# Patient Record
Sex: MALE | Race: WHITE | NOT HISPANIC OR LATINO | URBAN - METROPOLITAN AREA
[De-identification: names, ages, dates, MRNs, and addresses within clinical notes are randomized per-mention and may not be internally consistent; named-entity substitution may affect disease eponyms.]

---

## 2017-01-14 ENCOUNTER — EMERGENCY (EMERGENCY)
Facility: HOSPITAL | Age: 27
LOS: 0 days | Discharge: HOME | End: 2017-01-14

## 2017-06-27 DIAGNOSIS — Z87.891 PERSONAL HISTORY OF NICOTINE DEPENDENCE: ICD-10-CM

## 2017-06-27 DIAGNOSIS — R00.2 PALPITATIONS: ICD-10-CM

## 2017-06-27 DIAGNOSIS — R06.02 SHORTNESS OF BREATH: ICD-10-CM

## 2018-06-29 ENCOUNTER — INPATIENT (INPATIENT)
Facility: HOSPITAL | Age: 28
LOS: 1 days | Discharge: HOME | End: 2018-07-01
Attending: SURGERY | Admitting: SURGERY
Payer: COMMERCIAL

## 2018-06-29 VITALS
OXYGEN SATURATION: 98 % | HEIGHT: 69 IN | SYSTOLIC BLOOD PRESSURE: 134 MMHG | DIASTOLIC BLOOD PRESSURE: 89 MMHG | HEART RATE: 78 BPM | RESPIRATION RATE: 16 BRPM | WEIGHT: 220.02 LBS | TEMPERATURE: 99 F

## 2018-06-29 LAB
ALBUMIN SERPL ELPH-MCNC: 4.6 G/DL — SIGNIFICANT CHANGE UP (ref 3.5–5.2)
ALP SERPL-CCNC: 66 U/L — SIGNIFICANT CHANGE UP (ref 30–115)
ALT FLD-CCNC: 70 U/L — HIGH (ref 0–41)
ANION GAP SERPL CALC-SCNC: 19 MMOL/L — HIGH (ref 7–14)
AST SERPL-CCNC: 82 U/L — HIGH (ref 0–41)
BASOPHILS # BLD AUTO: 0.02 K/UL — SIGNIFICANT CHANGE UP (ref 0–0.2)
BASOPHILS NFR BLD AUTO: 0.2 % — SIGNIFICANT CHANGE UP (ref 0–1)
BILIRUB SERPL-MCNC: 0.8 MG/DL — SIGNIFICANT CHANGE UP (ref 0.2–1.2)
BLD GP AB SCN SERPL QL: SIGNIFICANT CHANGE UP
BUN SERPL-MCNC: 14 MG/DL — SIGNIFICANT CHANGE UP (ref 10–20)
CALCIUM SERPL-MCNC: 9.4 MG/DL — SIGNIFICANT CHANGE UP (ref 8.5–10.1)
CHLORIDE SERPL-SCNC: 97 MMOL/L — LOW (ref 98–110)
CK SERPL-CCNC: 590 U/L — HIGH (ref 0–225)
CO2 SERPL-SCNC: 23 MMOL/L — SIGNIFICANT CHANGE UP (ref 17–32)
CREAT SERPL-MCNC: 1.3 MG/DL — SIGNIFICANT CHANGE UP (ref 0.7–1.5)
EOSINOPHIL # BLD AUTO: 0.09 K/UL — SIGNIFICANT CHANGE UP (ref 0–0.7)
EOSINOPHIL NFR BLD AUTO: 1 % — SIGNIFICANT CHANGE UP (ref 0–8)
ETHANOL SERPL-MCNC: <10 MG/DL — HIGH
GLUCOSE SERPL-MCNC: 123 MG/DL — HIGH (ref 70–99)
HCT VFR BLD CALC: 45.1 % — SIGNIFICANT CHANGE UP (ref 42–52)
HGB BLD-MCNC: 15.1 G/DL — SIGNIFICANT CHANGE UP (ref 14–18)
IMM GRANULOCYTES NFR BLD AUTO: 1 % — HIGH (ref 0.1–0.3)
LACTATE SERPL-SCNC: 1.8 MMOL/L — SIGNIFICANT CHANGE UP (ref 0.5–2.2)
LIDOCAIN IGE QN: 63 U/L — HIGH (ref 7–60)
LYMPHOCYTES # BLD AUTO: 2.43 K/UL — SIGNIFICANT CHANGE UP (ref 1.2–3.4)
LYMPHOCYTES # BLD AUTO: 28 % — SIGNIFICANT CHANGE UP (ref 20.5–51.1)
MCHC RBC-ENTMCNC: 28.8 PG — SIGNIFICANT CHANGE UP (ref 27–31)
MCHC RBC-ENTMCNC: 33.5 G/DL — SIGNIFICANT CHANGE UP (ref 32–37)
MCV RBC AUTO: 85.9 FL — SIGNIFICANT CHANGE UP (ref 80–94)
MONOCYTES # BLD AUTO: 0.67 K/UL — HIGH (ref 0.1–0.6)
MONOCYTES NFR BLD AUTO: 7.7 % — SIGNIFICANT CHANGE UP (ref 1.7–9.3)
NEUTROPHILS # BLD AUTO: 5.38 K/UL — SIGNIFICANT CHANGE UP (ref 1.4–6.5)
NEUTROPHILS NFR BLD AUTO: 62.1 % — SIGNIFICANT CHANGE UP (ref 42.2–75.2)
NRBC # BLD: 0 /100 WBCS — SIGNIFICANT CHANGE UP (ref 0–0)
PLATELET # BLD AUTO: 252 K/UL — SIGNIFICANT CHANGE UP (ref 130–400)
POTASSIUM SERPL-MCNC: 4.3 MMOL/L — SIGNIFICANT CHANGE UP (ref 3.5–5)
POTASSIUM SERPL-SCNC: 4.3 MMOL/L — SIGNIFICANT CHANGE UP (ref 3.5–5)
PROT SERPL-MCNC: 7 G/DL — SIGNIFICANT CHANGE UP (ref 6–8)
RBC # BLD: 5.25 M/UL — SIGNIFICANT CHANGE UP (ref 4.7–6.1)
RBC # FLD: 12.6 % — SIGNIFICANT CHANGE UP (ref 11.5–14.5)
SODIUM SERPL-SCNC: 139 MMOL/L — SIGNIFICANT CHANGE UP (ref 135–146)
TROPONIN T SERPL-MCNC: <0.01 NG/ML — SIGNIFICANT CHANGE UP
TYPE + AB SCN PNL BLD: SIGNIFICANT CHANGE UP
WBC # BLD: 8.68 K/UL — SIGNIFICANT CHANGE UP (ref 4.8–10.8)
WBC # FLD AUTO: 8.68 K/UL — SIGNIFICANT CHANGE UP (ref 4.8–10.8)

## 2018-06-29 PROCEDURE — 99291 CRITICAL CARE FIRST HOUR: CPT

## 2018-06-29 RX ORDER — MORPHINE SULFATE 50 MG/1
4 CAPSULE, EXTENDED RELEASE ORAL ONCE
Qty: 0 | Refills: 0 | Status: DISCONTINUED | OUTPATIENT
Start: 2018-06-29 | End: 2018-06-29

## 2018-06-29 RX ORDER — BACITRACIN ZINC 500 UNIT/G
1 OINTMENT IN PACKET (EA) TOPICAL EVERY 6 HOURS
Qty: 0 | Refills: 0 | Status: DISCONTINUED | OUTPATIENT
Start: 2018-06-29 | End: 2018-07-01

## 2018-06-29 RX ORDER — MORPHINE SULFATE 50 MG/1
4 CAPSULE, EXTENDED RELEASE ORAL EVERY 4 HOURS
Qty: 0 | Refills: 0 | Status: DISCONTINUED | OUTPATIENT
Start: 2018-06-29 | End: 2018-06-29

## 2018-06-29 RX ORDER — ACETAMINOPHEN 500 MG
650 TABLET ORAL EVERY 6 HOURS
Qty: 0 | Refills: 0 | Status: DISCONTINUED | OUTPATIENT
Start: 2018-06-29 | End: 2018-07-01

## 2018-06-29 RX ORDER — PANTOPRAZOLE SODIUM 20 MG/1
40 TABLET, DELAYED RELEASE ORAL ONCE
Qty: 0 | Refills: 0 | Status: COMPLETED | OUTPATIENT
Start: 2018-06-29 | End: 2018-06-29

## 2018-06-29 RX ORDER — PHENYLEPHRINE HYDROCHLORIDE 10 MG/ML
0.1 INJECTION INTRAVENOUS
Qty: 40 | Refills: 0 | Status: DISCONTINUED | OUTPATIENT
Start: 2018-06-29 | End: 2018-06-29

## 2018-06-29 RX ORDER — SODIUM CHLORIDE 9 MG/ML
1000 INJECTION INTRAMUSCULAR; INTRAVENOUS; SUBCUTANEOUS
Qty: 0 | Refills: 0 | Status: DISCONTINUED | OUTPATIENT
Start: 2018-06-29 | End: 2018-06-30

## 2018-06-29 RX ORDER — MORPHINE SULFATE 50 MG/1
4 CAPSULE, EXTENDED RELEASE ORAL
Qty: 0 | Refills: 0 | Status: DISCONTINUED | OUTPATIENT
Start: 2018-06-29 | End: 2018-06-30

## 2018-06-29 RX ORDER — KETOROLAC TROMETHAMINE 30 MG/ML
30 SYRINGE (ML) INJECTION EVERY 6 HOURS
Qty: 0 | Refills: 0 | Status: DISCONTINUED | OUTPATIENT
Start: 2018-06-29 | End: 2018-07-01

## 2018-06-29 RX ADMIN — SODIUM CHLORIDE 100 MILLILITER(S): 9 INJECTION INTRAMUSCULAR; INTRAVENOUS; SUBCUTANEOUS at 12:30

## 2018-06-29 RX ADMIN — MORPHINE SULFATE 4 MILLIGRAM(S): 50 CAPSULE, EXTENDED RELEASE ORAL at 13:30

## 2018-06-29 RX ADMIN — Medication 30 MILLIGRAM(S): at 14:18

## 2018-06-29 RX ADMIN — MORPHINE SULFATE 4 MILLIGRAM(S): 50 CAPSULE, EXTENDED RELEASE ORAL at 09:10

## 2018-06-29 RX ADMIN — Medication 30 MILLIGRAM(S): at 18:07

## 2018-06-29 RX ADMIN — MORPHINE SULFATE 4 MILLIGRAM(S): 50 CAPSULE, EXTENDED RELEASE ORAL at 17:14

## 2018-06-29 RX ADMIN — Medication 650 MILLIGRAM(S): at 18:07

## 2018-06-29 RX ADMIN — PANTOPRAZOLE SODIUM 40 MILLIGRAM(S): 20 TABLET, DELAYED RELEASE ORAL at 14:20

## 2018-06-29 RX ADMIN — MORPHINE SULFATE 4 MILLIGRAM(S): 50 CAPSULE, EXTENDED RELEASE ORAL at 19:42

## 2018-06-29 RX ADMIN — MORPHINE SULFATE 4 MILLIGRAM(S): 50 CAPSULE, EXTENDED RELEASE ORAL at 16:45

## 2018-06-29 RX ADMIN — MORPHINE SULFATE 4 MILLIGRAM(S): 50 CAPSULE, EXTENDED RELEASE ORAL at 14:14

## 2018-06-29 RX ADMIN — MORPHINE SULFATE 4 MILLIGRAM(S): 50 CAPSULE, EXTENDED RELEASE ORAL at 18:08

## 2018-06-29 RX ADMIN — Medication 650 MILLIGRAM(S): at 14:17

## 2018-06-29 RX ADMIN — MORPHINE SULFATE 4 MILLIGRAM(S): 50 CAPSULE, EXTENDED RELEASE ORAL at 16:00

## 2018-06-29 RX ADMIN — MORPHINE SULFATE 4 MILLIGRAM(S): 50 CAPSULE, EXTENDED RELEASE ORAL at 09:16

## 2018-06-29 RX ADMIN — Medication 650 MILLIGRAM(S): at 13:30

## 2018-06-29 RX ADMIN — Medication 1 APPLICATION(S): at 18:07

## 2018-06-29 RX ADMIN — Medication 30 MILLIGRAM(S): at 13:30
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
## 2018-06-29 NOTE — ED PROVIDER NOTE - PROGRESS NOTE DETAILS
Chief Complaint   Patient presents with    Medication Refill   This patient is accompanied in the office by his mother. Mother statets child is here for medication refill. Mother states child has not taken medication in 1 year. 1. Have you been to the ER, urgent care clinic since your last visit? Hospitalized since your last visit? No    2. Have you seen or consulted any other health care providers outside of the [address] [place] [address] since your last visit? Include any pap smears or colon screening.  No Code Trauma called on EMS notification.  Dr. Rivero at bedside prior to pt arrival.
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
## 2018-06-29 NOTE — ED ADULT NURSE REASSESSMENT NOTE - RESPONSE TO
Incentive spirometer given, teaching done, pt return demonstrated. Verbalized understanding to inform RN of pain.

## 2018-06-29 NOTE — H&P ADULT - NSHPPHYSICALEXAM_GEN_ALL_CORE
ICU Vital Signs Last 24 Hrs  T(F): 98.6 (29 Jun 2018 07:56), Max: 98.6 (29 Jun 2018 07:56)  HR: 90 (29 Jun 2018 09:16) (78 - 90)  BP: 115/57 (29 Jun 2018 09:16) (115/57 - 144/68)  RR: 16 (29 Jun 2018 09:16) (16 - 18)  SpO2: 99% (29 Jun 2018 09:16) (98% - 99%)    CONSTITUTIONAL: GCS 15,Well-developed; well-nourished; in no acute distress.   SKIN: skin exam is warm and dry, abrasion to r. buttocks and r. elbow. left knee abrasion  HEAD: Normocephalic; atraumatic.  EYES:  EOM intact; conjunctiva and sclera clear.  ENT: PERRLA No nasal discharge; airway clear. moist oral mucosa;   NECK: Supple; non tender.  CARD: S1, S2 normal; no murmurs, gallops, or rubs. Regular rate and rhythm. posterior tibial and radial pulses 2+  RESP: No wheezes, rales or rhonchi. cta b/l. no use of accessory muscles. no retractions  ABD: Normal bowel sounds; soft; non-distended; non-tender; no rebound. good rectal tone  MSK: Normal ROM. moving all 4 extremities, no saddle anesthesia  BACK: No cva tenderness. no midline tenderness.    NEURO: Alert, oriented, grossly unremarkable.    PSYCH: Cooperative, appropriate.

## 2018-06-29 NOTE — H&P ADULT - NSHPLABSRESULTS_GEN_ALL_CORE
Complete Blood Count + Automated Diff (06.29.18 @ 07:52)    WBC Count: 8.68 K/uL    RBC Count: 5.25 M/uL    Hemoglobin: 15.1 g/dL    Hematocrit: 45.1 %    Mean Cell Volume: 85.9 fL    Mean Cell Hemoglobin: 28.8 pg    Mean Cell Hemoglobin Conc: 33.5 g/dL    Red Cell Distrib Width: 12.6 %    Platelet Count - Automated: 252 K/uL    Auto Neutrophil #: 5.38 K/uL    Auto Lymphocyte #: 2.43 K/uL    Auto Monocyte #: 0.67 K/uL    Auto Eosinophil #: 0.09 K/uL    Auto Basophil #: 0.02 K/uL    Auto Neutrophil %: 62.1: Differential percentages must be correlated with absolute numbers for  clinical significance. %    Auto Lymphocyte %: 28.0 %    Auto Monocyte %: 7.7 %    Auto Eosinophil %: 1.0 %    Auto Basophil %: 0.2 %    Auto Immature Granulocyte %: 1.0 %      Comprehensive Metabolic Panel (06.29.18 @ 07:52)    Sodium, Serum: 139 mmol/L    Potassium, Serum: 4.3: Slighty Hemolyzed use with Caution mmol/L    Chloride, Serum: 97 mmol/L    Carbon Dioxide, Serum: 23 mmol/L    Anion Gap, Serum: 19 mmol/L    Blood Urea Nitrogen, Serum: 14 mg/dL    Creatinine, Serum: 1.3 mg/dL    Glucose, Serum: 123 mg/dL    Calcium, Total Serum: 9.4 mg/dL    Protein Total, Serum: 7.0 g/dL    Albumin, Serum: 4.6 g/dL    Bilirubin Total, Serum: 0.8 mg/dL    Alkaline Phosphatase, Serum: 66 U/L    Aspartate Aminotransferase (AST/SGOT): 82: Hemolyzed. Interpret with caution U/L    Alanine Aminotransferase (ALT/SGPT): 70 U/L  < from: CT Head No Cont (06.29.18 @ 09:27) >    Radiology:    IMPRESSION:     No acute fractures, mass effect, midline shift or hemorrhage.     < end of copied text >    < from: Xray Chest 1 View- PORTABLE-Urgent (06.29.18 @ 08:01) >      Impression:      Multiple right-sided rib fractures.    Pulmonary vascular congestion    < end of copied text >    < from: CT Cervical Spine No Cont (06.29.18 @ 08:20) >    IMPRESSION:     1.  Degenerative changes of the cervical spine C4-5, C5-6 and C6-7 as   described above.    2.  No acute fractures or dislocations.    3.  If patient continues to be symptomatic or if there is suspicion for   ligamentous injury, MRI of the cervical spine may be helpful for further   evaluation.      < end of copied text >    < from: CT Angio Chest w/ IV Cont (06.29.18 @ 08:27) >    IMPRESSION:    1.  Multiple right rib fractures involving third through eighth ribs,   with minimal displacement of the third rib fracture.  2.  Patchy opacities in right upper, middle and lower lungs, which may   represent either pulmonary hemorrhage or contusion. No evidence of   pneumothorax.   3.  Small chest wall emphysema in right upper chest    < end of copied text >    < from: Xray Femur 2 Views, Right (06.29.18 @ 09:06) >    Impression:  No evidence of acute bony abnormality    < end of copied text >
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
## 2018-06-29 NOTE — ED PROVIDER NOTE - PHYSICAL EXAMINATION
VITAL SIGNS: I have reviewed nursing notes and confirm.  CONSTITUTIONAL: Well-developed; well-nourished; in no acute distress.   SKIN: skin exam is warm and dry, abrasion to r. buttocks and r. elbow.  HEAD: Normocephalic; atraumatic.  EYES:  EOM intact; conjunctiva and sclera clear.  ENT: PERRLA No nasal discharge; airway clear. moist oral mucosa;   NECK: Supple; non tender.  CARD: S1, S2 normal; no murmurs, gallops, or rubs. Regular rate and rhythm. posterior tibial and radial pulses 2+  RESP: No wheezes, rales or rhonchi. cta b/l. no use of accessory muscles. no retractions  ABD: Normal bowel sounds; soft; non-distended; non-tender; no rebound. good rectal tone  EXT: Normal ROM. No  cyanosis or edema.  BACK: No cva tenderness. no midline tenderness.    LYMPH: No acute cervical adenopathy.  NEURO: Alert, oriented, grossly unremarkable.    PSYCH: Cooperative, appropriate.
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
## 2018-06-29 NOTE — ED PROCEDURE NOTE - CPROC ED ARTER LINE DETAIL1
Positive blood return was obtained via the catheter./Hemostasis was achieved with direct pressure, and a dry sterile dressing applied./Using sterile technique, the correct location was identified, and a needle was inserted into the artery (specify in FT)./Connected to a pressurized flush line./Line was sutured in place.

## 2018-06-29 NOTE — ED PROCEDURE NOTE - CPROC ED INFUS LINE DETAIL1
All lumen(s) aspirated and flushed without difficulty./The catheter was placed using sterile technique./The guidewire was recovered./Ultrasound guidance was used during placement./The location was identified, and the area was draped and prepped.
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
## 2018-06-29 NOTE — ED PROVIDER NOTE - NS ED ROS FT
ROS: No fever/chills, No headache/photophobia/eye pain/changes in vision, No ear pain/sore throat/dysphagia, No chest pain/palpitations, no SOB/cough/wheeze/stridor, No abdominal pain, No N/V/D/melena, no dysuria/frequency/discharge, No neck/back pain, no rash, no changes in neurological status/function.    +chest l. pain with breathing.

## 2018-06-30 LAB
AMPHET UR-MCNC: NEGATIVE — SIGNIFICANT CHANGE UP
ANION GAP SERPL CALC-SCNC: 8 MMOL/L — SIGNIFICANT CHANGE UP (ref 7–14)
APPEARANCE UR: (no result)
BARBITURATES UR SCN-MCNC: NEGATIVE — SIGNIFICANT CHANGE UP
BENZODIAZ UR-MCNC: NEGATIVE — SIGNIFICANT CHANGE UP
BILIRUB UR-MCNC: NEGATIVE — SIGNIFICANT CHANGE UP
BUN SERPL-MCNC: 19 MG/DL — SIGNIFICANT CHANGE UP (ref 10–20)
CALCIUM SERPL-MCNC: 8.7 MG/DL — SIGNIFICANT CHANGE UP (ref 8.5–10.1)
CHLORIDE SERPL-SCNC: 103 MMOL/L — SIGNIFICANT CHANGE UP (ref 98–110)
CO2 SERPL-SCNC: 27 MMOL/L — SIGNIFICANT CHANGE UP (ref 17–32)
COCAINE METAB.OTHER UR-MCNC: NEGATIVE — SIGNIFICANT CHANGE UP
COLOR SPEC: YELLOW — SIGNIFICANT CHANGE UP
CREAT SERPL-MCNC: 1.2 MG/DL — SIGNIFICANT CHANGE UP (ref 0.7–1.5)
DIFF PNL FLD: NEGATIVE — SIGNIFICANT CHANGE UP
GLUCOSE SERPL-MCNC: 101 MG/DL — HIGH (ref 70–99)
GLUCOSE UR QL: NEGATIVE — SIGNIFICANT CHANGE UP
HCT VFR BLD CALC: 39.6 % — LOW (ref 42–52)
HGB BLD-MCNC: 12.7 G/DL — LOW (ref 14–18)
KETONES UR-MCNC: NEGATIVE — SIGNIFICANT CHANGE UP
LEUKOCYTE ESTERASE UR-ACNC: NEGATIVE — SIGNIFICANT CHANGE UP
MAGNESIUM SERPL-MCNC: 2.2 MG/DL — SIGNIFICANT CHANGE UP (ref 1.8–2.4)
MCHC RBC-ENTMCNC: 28.5 PG — SIGNIFICANT CHANGE UP (ref 27–31)
MCHC RBC-ENTMCNC: 32.1 G/DL — SIGNIFICANT CHANGE UP (ref 32–37)
MCV RBC AUTO: 88.8 FL — SIGNIFICANT CHANGE UP (ref 80–94)
METHADONE UR-MCNC: NEGATIVE — SIGNIFICANT CHANGE UP
NITRITE UR-MCNC: NEGATIVE — SIGNIFICANT CHANGE UP
NRBC # BLD: 0 /100 WBCS — SIGNIFICANT CHANGE UP (ref 0–0)
OPIATES UR-MCNC: POSITIVE
PCP SPEC-MCNC: SIGNIFICANT CHANGE UP
PH UR: 7 — SIGNIFICANT CHANGE UP (ref 5–8)
PHOSPHATE SERPL-MCNC: 3.6 MG/DL — SIGNIFICANT CHANGE UP (ref 2.1–4.9)
PLATELET # BLD AUTO: 186 K/UL — SIGNIFICANT CHANGE UP (ref 130–400)
POTASSIUM SERPL-MCNC: 4.9 MMOL/L — SIGNIFICANT CHANGE UP (ref 3.5–5)
POTASSIUM SERPL-SCNC: 4.9 MMOL/L — SIGNIFICANT CHANGE UP (ref 3.5–5)
PROPOXYPHENE QUALITATIVE URINE RESULT: NEGATIVE — SIGNIFICANT CHANGE UP
PROT UR-MCNC: (no result)
RBC # BLD: 4.46 M/UL — LOW (ref 4.7–6.1)
RBC # FLD: 13 % — SIGNIFICANT CHANGE UP (ref 11.5–14.5)
SODIUM SERPL-SCNC: 138 MMOL/L — SIGNIFICANT CHANGE UP (ref 135–146)
SP GR SPEC: >=1.03 — SIGNIFICANT CHANGE UP (ref 1.01–1.03)
UROBILINOGEN FLD QL: 1 (ref 0.2–0.2)
WBC # BLD: 7.66 K/UL — SIGNIFICANT CHANGE UP (ref 4.8–10.8)
WBC # FLD AUTO: 7.66 K/UL — SIGNIFICANT CHANGE UP (ref 4.8–10.8)

## 2018-06-30 PROCEDURE — 99233 SBSQ HOSP IP/OBS HIGH 50: CPT

## 2018-06-30 RX ORDER — OXYCODONE HYDROCHLORIDE 5 MG/1
5 TABLET ORAL EVERY 4 HOURS
Qty: 0 | Refills: 0 | Status: DISCONTINUED | OUTPATIENT
Start: 2018-06-30 | End: 2018-06-30

## 2018-06-30 RX ORDER — OXYCODONE HYDROCHLORIDE 5 MG/1
10 TABLET ORAL EVERY 4 HOURS
Qty: 0 | Refills: 0 | Status: DISCONTINUED | OUTPATIENT
Start: 2018-06-30 | End: 2018-07-01

## 2018-06-30 RX ORDER — MORPHINE SULFATE 50 MG/1
4 CAPSULE, EXTENDED RELEASE ORAL ONCE
Qty: 0 | Refills: 0 | Status: DISCONTINUED | OUTPATIENT
Start: 2018-06-30 | End: 2018-06-30

## 2018-06-30 RX ORDER — MORPHINE SULFATE 50 MG/1
2 CAPSULE, EXTENDED RELEASE ORAL EVERY 4 HOURS
Qty: 0 | Refills: 0 | Status: DISCONTINUED | OUTPATIENT
Start: 2018-06-30 | End: 2018-07-01

## 2018-06-30 RX ORDER — PANTOPRAZOLE SODIUM 20 MG/1
40 TABLET, DELAYED RELEASE ORAL
Qty: 0 | Refills: 0 | Status: DISCONTINUED | OUTPATIENT
Start: 2018-06-30 | End: 2018-07-01

## 2018-06-30 RX ORDER — OXYCODONE HYDROCHLORIDE 5 MG/1
15 TABLET ORAL EVERY 4 HOURS
Qty: 0 | Refills: 0 | Status: DISCONTINUED | OUTPATIENT
Start: 2018-06-30 | End: 2018-07-01

## 2018-06-30 RX ADMIN — Medication 650 MILLIGRAM(S): at 08:06

## 2018-06-30 RX ADMIN — Medication 30 MILLIGRAM(S): at 00:47

## 2018-06-30 RX ADMIN — OXYCODONE HYDROCHLORIDE 15 MILLIGRAM(S): 5 TABLET ORAL at 21:16

## 2018-06-30 RX ADMIN — PANTOPRAZOLE SODIUM 40 MILLIGRAM(S): 20 TABLET, DELAYED RELEASE ORAL at 10:39

## 2018-06-30 RX ADMIN — Medication 650 MILLIGRAM(S): at 00:46

## 2018-06-30 RX ADMIN — OXYCODONE HYDROCHLORIDE 5 MILLIGRAM(S): 5 TABLET ORAL at 16:39

## 2018-06-30 RX ADMIN — Medication 650 MILLIGRAM(S): at 11:25

## 2018-06-30 RX ADMIN — OXYCODONE HYDROCHLORIDE 5 MILLIGRAM(S): 5 TABLET ORAL at 16:09

## 2018-06-30 RX ADMIN — Medication 30 MILLIGRAM(S): at 17:12

## 2018-06-30 RX ADMIN — Medication 650 MILLIGRAM(S): at 06:31

## 2018-06-30 RX ADMIN — Medication 30 MILLIGRAM(S): at 11:26

## 2018-06-30 RX ADMIN — Medication 1 APPLICATION(S): at 11:26

## 2018-06-30 RX ADMIN — MORPHINE SULFATE 2 MILLIGRAM(S): 50 CAPSULE, EXTENDED RELEASE ORAL at 19:51

## 2018-06-30 RX ADMIN — MORPHINE SULFATE 4 MILLIGRAM(S): 50 CAPSULE, EXTENDED RELEASE ORAL at 08:32

## 2018-06-30 RX ADMIN — Medication 650 MILLIGRAM(S): at 17:12

## 2018-06-30 RX ADMIN — Medication 1 APPLICATION(S): at 06:31

## 2018-06-30 RX ADMIN — OXYCODONE HYDROCHLORIDE 5 MILLIGRAM(S): 5 TABLET ORAL at 10:53

## 2018-06-30 RX ADMIN — Medication 1 APPLICATION(S): at 17:12

## 2018-06-30 RX ADMIN — Medication 1 APPLICATION(S): at 00:46

## 2018-06-30 RX ADMIN — Medication 30 MILLIGRAM(S): at 08:06

## 2018-06-30 RX ADMIN — Medication 30 MILLIGRAM(S): at 06:31

## 2018-06-30 RX ADMIN — MORPHINE SULFATE 4 MILLIGRAM(S): 50 CAPSULE, EXTENDED RELEASE ORAL at 12:22
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
## 2018-07-01 ENCOUNTER — TRANSCRIPTION ENCOUNTER (OUTPATIENT)
Age: 28
End: 2018-07-01

## 2018-07-01 VITALS
HEART RATE: 70 BPM | SYSTOLIC BLOOD PRESSURE: 137 MMHG | RESPIRATION RATE: 18 BRPM | DIASTOLIC BLOOD PRESSURE: 65 MMHG | TEMPERATURE: 98 F | OXYGEN SATURATION: 96 %

## 2018-07-01 LAB
ANION GAP SERPL CALC-SCNC: 12 MMOL/L — SIGNIFICANT CHANGE UP (ref 7–14)
BUN SERPL-MCNC: 17 MG/DL — SIGNIFICANT CHANGE UP (ref 10–20)
CALCIUM SERPL-MCNC: 8.6 MG/DL — SIGNIFICANT CHANGE UP (ref 8.5–10.1)
CHLORIDE SERPL-SCNC: 104 MMOL/L — SIGNIFICANT CHANGE UP (ref 98–110)
CO2 SERPL-SCNC: 25 MMOL/L — SIGNIFICANT CHANGE UP (ref 17–32)
CREAT SERPL-MCNC: 1.4 MG/DL — SIGNIFICANT CHANGE UP (ref 0.7–1.5)
GLUCOSE SERPL-MCNC: 102 MG/DL — HIGH (ref 70–99)
HCT VFR BLD CALC: 37.2 % — LOW (ref 42–52)
HGB BLD-MCNC: 11.8 G/DL — LOW (ref 14–18)
MAGNESIUM SERPL-MCNC: 2 MG/DL — SIGNIFICANT CHANGE UP (ref 1.8–2.4)
MCHC RBC-ENTMCNC: 28.2 PG — SIGNIFICANT CHANGE UP (ref 27–31)
MCHC RBC-ENTMCNC: 31.7 G/DL — LOW (ref 32–37)
MCV RBC AUTO: 88.8 FL — SIGNIFICANT CHANGE UP (ref 80–94)
NRBC # BLD: 0 /100 WBCS — SIGNIFICANT CHANGE UP (ref 0–0)
PHOSPHATE SERPL-MCNC: 3.3 MG/DL — SIGNIFICANT CHANGE UP (ref 2.1–4.9)
PLATELET # BLD AUTO: 172 K/UL — SIGNIFICANT CHANGE UP (ref 130–400)
POTASSIUM SERPL-MCNC: 4.5 MMOL/L — SIGNIFICANT CHANGE UP (ref 3.5–5)
POTASSIUM SERPL-SCNC: 4.5 MMOL/L — SIGNIFICANT CHANGE UP (ref 3.5–5)
RBC # BLD: 4.19 M/UL — LOW (ref 4.7–6.1)
RBC # FLD: 12.9 % — SIGNIFICANT CHANGE UP (ref 11.5–14.5)
SODIUM SERPL-SCNC: 141 MMOL/L — SIGNIFICANT CHANGE UP (ref 135–146)
WBC # BLD: 7.47 K/UL — SIGNIFICANT CHANGE UP (ref 4.8–10.8)
WBC # FLD AUTO: 7.47 K/UL — SIGNIFICANT CHANGE UP (ref 4.8–10.8)

## 2018-07-01 PROCEDURE — 99233 SBSQ HOSP IP/OBS HIGH 50: CPT

## 2018-07-01 RX ORDER — DOCUSATE SODIUM 100 MG
1 CAPSULE ORAL
Qty: 0 | Refills: 0 | COMMUNITY
Start: 2018-07-01

## 2018-07-01 RX ORDER — SENNA PLUS 8.6 MG/1
2 TABLET ORAL
Qty: 0 | Refills: 0 | COMMUNITY
Start: 2018-07-01

## 2018-07-01 RX ORDER — IBUPROFEN 200 MG
1 TABLET ORAL
Qty: 0 | Refills: 0 | COMMUNITY
Start: 2018-07-01

## 2018-07-01 RX ORDER — SENNA PLUS 8.6 MG/1
2 TABLET ORAL AT BEDTIME
Qty: 0 | Refills: 0 | Status: DISCONTINUED | OUTPATIENT
Start: 2018-07-01 | End: 2018-07-01

## 2018-07-01 RX ORDER — ACETAMINOPHEN 500 MG
2 TABLET ORAL
Qty: 0 | Refills: 0 | COMMUNITY
Start: 2018-07-01

## 2018-07-01 RX ORDER — IBUPROFEN 200 MG
600 TABLET ORAL EVERY 6 HOURS
Qty: 0 | Refills: 0 | Status: DISCONTINUED | OUTPATIENT
Start: 2018-07-01 | End: 2018-07-01

## 2018-07-01 RX ORDER — OXYCODONE HYDROCHLORIDE 5 MG/1
1 TABLET ORAL
Qty: 10 | Refills: 0 | OUTPATIENT
Start: 2018-07-01

## 2018-07-01 RX ORDER — DOCUSATE SODIUM 100 MG
100 CAPSULE ORAL DAILY
Qty: 0 | Refills: 0 | Status: DISCONTINUED | OUTPATIENT
Start: 2018-07-01 | End: 2018-07-01

## 2018-07-01 RX ADMIN — Medication 650 MILLIGRAM(S): at 13:18

## 2018-07-01 RX ADMIN — Medication 650 MILLIGRAM(S): at 07:40

## 2018-07-01 RX ADMIN — Medication 100 MILLIGRAM(S): at 11:54

## 2018-07-01 RX ADMIN — Medication 650 MILLIGRAM(S): at 07:46

## 2018-07-01 RX ADMIN — OXYCODONE HYDROCHLORIDE 10 MILLIGRAM(S): 5 TABLET ORAL at 11:06

## 2018-07-01 RX ADMIN — Medication 600 MILLIGRAM(S): at 11:54

## 2018-07-01 RX ADMIN — OXYCODONE HYDROCHLORIDE 10 MILLIGRAM(S): 5 TABLET ORAL at 08:08

## 2018-07-01 RX ADMIN — Medication 1 APPLICATION(S): at 07:40

## 2018-07-01 RX ADMIN — PANTOPRAZOLE SODIUM 40 MILLIGRAM(S): 20 TABLET, DELAYED RELEASE ORAL at 07:40

## 2018-07-01 RX ADMIN — MORPHINE SULFATE 2 MILLIGRAM(S): 50 CAPSULE, EXTENDED RELEASE ORAL at 07:35

## 2018-07-01 RX ADMIN — Medication 30 MILLIGRAM(S): at 07:42

## 2018-07-01 RX ADMIN — OXYCODONE HYDROCHLORIDE 10 MILLIGRAM(S): 5 TABLET ORAL at 11:53

## 2018-07-01 RX ADMIN — Medication 1 APPLICATION(S): at 11:06

## 2018-07-01 RX ADMIN — Medication 30 MILLIGRAM(S): at 07:46

## 2018-07-01 RX ADMIN — MORPHINE SULFATE 2 MILLIGRAM(S): 50 CAPSULE, EXTENDED RELEASE ORAL at 06:47

## 2018-07-01 RX ADMIN — OXYCODONE HYDROCHLORIDE 10 MILLIGRAM(S): 5 TABLET ORAL at 13:17

## 2018-07-01 NOTE — DISCHARGE NOTE ADULT - INSTRUCTIONS
Continue regular diet.    May not return to work until cleared by trauma surgeon in clinic in 1 week.

## 2018-07-01 NOTE — DISCHARGE NOTE ADULT - PATIENT PORTAL LINK FT
You can access the [facility] Portal, offered by [facility], by registering with the following website: [email or web address]
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
## 2018-07-01 NOTE — DISCHARGE NOTE ADULT - CARE PROVIDER_API CALL
Markie Rivero), Surgery  [address] [place] [address]  [address] [place] [address]  Phone: [phone]  Fax: [phone]

## 2018-07-01 NOTE — DISCHARGE NOTE ADULT - MEDICATION SUMMARY - MEDICATIONS TO TAKE
I will START or STAY ON the medications listed below when I get home from the hospital:    acetaminophen 325 mg oral tablet  -- 2 tab(s) by mouth every 6 hours  -- Indication: For pain    ibuprofen 600 mg oral tablet  -- 1 tab(s) by mouth every 6 hours  -- Indication: For Mild pain    oxyCODONE 5 mg oral capsule  -- 1 cap(s) by mouth every 6 hours, As Needed -for severe pain MDD:4 tablets   -- Caution federal law prohibits the transfer of this drug to any person other  than the person for whom it was prescribed.  It is very important that you take or use this exactly as directed.  Do not skip doses or discontinue unless directed by your doctor.  May cause drowsiness.  Alcohol may intensify this effect.  Use care when operating dangerous machinery.  This prescription cannot be refilled.  Using more of this medication than prescribed may cause serious breathing problems.    -- Indication: For severe pain    docusate sodium 100 mg oral capsule  -- 1 cap(s) by mouth once a day  -- Indication: For constipation    senna oral tablet  -- 2 tab(s) by mouth once a day (at bedtime)  -- Indication: For constipation

## 2018-07-01 NOTE — DISCHARGE NOTE ADULT - CARE PLAN
Principal Discharge DX:	Closed fracture of multiple ribs of right side, initial encounter  Goal:	Complete recovery expected, patient to follow up with physician in 1-2 weeks  Assessment and plan of treatment:	Pain control: Tylenol and Motrin around the clock for mild-moderate pain and Oxycodone every 6 hours as needed for severe pain.  Please continue to use incentive spirometer 10 times per hour throughout the day.  Please continue to ambulate as tolerated.  Avoid heavy lifting with right arm, contact sports, and aggressive physical activity for 3-6 weeks. Principal Discharge DX:	Closed fracture of multiple ribs of right side, initial encounter  Goal:	Complete recovery expected, patient to follow up with physician in 1-2 weeks  Assessment and plan of treatment:	Pain control: Tylenol and Motrin around the clock for mild-moderate pain and Oxycodone every 6 hours as needed for severe pain.  You will need to follow up with a pain management physician: [phone].  Please continue to use incentive spirometer 10 times per hour throughout the day.  Please continue to ambulate as tolerated.  Avoid heavy lifting with right arm, contact sports, and aggressive physical activity for 3-6 weeks.

## 2018-07-01 NOTE — DISCHARGE NOTE ADULT - PLAN OF CARE
Complete recovery expected, patient to follow up with physician in 1-2 weeks Pain control: Tylenol and Motrin around the clock for mild-moderate pain and Oxycodone every 6 hours as needed for severe pain.  Please continue to use incentive spirometer 10 times per hour throughout the day.  Please continue to ambulate as tolerated.  Avoid heavy lifting with right arm, contact sports, and aggressive physical activity for 3-6 weeks. Pain control: Tylenol and Motrin around the clock for mild-moderate pain and Oxycodone every 6 hours as needed for severe pain.  You will need to follow up with a pain management physician: [phone].  Please continue to use incentive spirometer 10 times per hour throughout the day.  Please continue to ambulate as tolerated.  Avoid heavy lifting with right arm, contact sports, and aggressive physical activity for 3-6 weeks.

## 2018-07-01 NOTE — PROGRESS NOTE ADULT - SUBJECTIVE AND OBJECTIVE BOX
GENERAL SURGERY PROGRESS NOTE    Patient: SATISH HARTMANN , 28y ([birth year])Male   MRN: [id number]  Location: [address]   Visit: 06-29-18 Inpatient  Date: 07-01-18 @ 01:14    Hospital Day #: 3     Procedure/Dx/Injuries:  R 3rd to 8th rib fractures     Events of past 24 hours:    PAST MEDICAL & SURGICAL HISTORY:  No pertinent past medical history  No significant past surgical history      Vitals: T(F): 97.4 (07-01-18 @ 00:08), Max: 98.7 (06-30-18 @ 04:00)  HR: 68 (07-01-18 @ 00:08)  BP: 117/55 (07-01-18 @ 00:08)  RR: 18 (07-01-18 @ 00:08)  SpO2: 97% (06-30-18 @ 12:00)    Diet, Regular      Fluids: IVL    I & O's:    06-29-18 @ 07:01  -  06-30-18 @ 07:00  --------------------------------------------------------  IN:    Oral Fluid: 640 mL    sodium chloride 0.9%: 1900 mL  Total IN: 2540 mL    OUT:    Voided: 700 mL  Total OUT: 700 mL    Total NET: 1840 mL    PHYSICAL EXAM  GEN: NAD  CV/LUNG: CTAB, RRR  ABD: SOFT, NON DISTENDED, NON-TENDER, NO REBOUND, NO GUARDING    MEDICATIONS  (STANDING):  acetaminophen   Tablet. 650 milliGRAM(s) Oral every 6 hours  BACItracin   Ointment 1 Application(s) Topical every 6 hours  ketorolac   Injectable 30 milliGRAM(s) IV Push every 6 hours  pantoprazole    Tablet 40 milliGRAM(s) Oral before breakfast    MEDICATIONS  (PRN):  morphine  - Injectable 2 milliGRAM(s) IV Push every 4 hours PRN Breakthrough pain  oxyCODONE    IR 10 milliGRAM(s) Oral every 4 hours PRN Moderate Pain (4 - 6)  oxyCODONE    IR 15 milliGRAM(s) Oral every 4 hours PRN Severe Pain (7 - 10)    LAB/STUDIES:  CAPILLARY BLOOD GLUCOSE               12.7   7.66  )-----------( 186      ( 30 Jun 2018 04:39 )             39.6     06-30    138  |  103  |  19  ----------------------------<  101<H>  4.9   |  27  |  1.2    Ca    8.7      30 Jun 2018 04:39  Phos  3.6     06-30  Mg     2.2     06-30    TPro  7.0  /  Alb  4.6  /  TBili  0.8  /  DBili  x   /  AST  82<H>  /  ALT  70<H>  /  AlkPhos  66  06-29               7.0  | 0.8  | 82       ------------------[66      ( 29 Jun 2018 07:52 )  4.6  | x    | 70            Urinalysis Basic - ( 30 Jun 2018 07:52 )    Color: Yellow / Appearance: Turbid / SG: >=1.030 / pH: x  Gluc: x / Ketone: Negative  / Bili: Negative / Urobili: 1.0   Blood: x / Protein: Trace / Nitrite: Negative   Leuk Esterase: Negative / RBC: x / WBC x   Sq Epi: x / Non Sq Epi: x / Bacteria: x      CARDIAC MARKERS ( 29 Jun 2018 07:52 )  x     / <0.01 ng/mL / 590 U/L / x     / x              IMAGING:

## 2018-07-01 NOTE — DISCHARGE NOTE ADULT - ADDITIONAL INSTRUCTIONS
Follow up with physician in 1-2 weeks. Follow up with physician in 1-2 weeks.  Follow up with pain management physician: [phone]

## 2018-07-02 PROBLEM — Z00.00 ENCOUNTER FOR PREVENTIVE HEALTH EXAMINATION: Status: ACTIVE | Noted: 2018-07-02

## 2018-07-06 DIAGNOSIS — S70.11XA CONTUSION OF RIGHT THIGH, INITIAL ENCOUNTER: ICD-10-CM

## 2018-07-06 DIAGNOSIS — S22.41XA MULTIPLE FRACTURES OF RIBS, RIGHT SIDE, INITIAL ENCOUNTER FOR CLOSED FRACTURE: ICD-10-CM

## 2018-07-06 DIAGNOSIS — R04.9 HEMORRHAGE FROM RESPIRATORY PASSAGES, UNSPECIFIED: ICD-10-CM
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
## 2018-07-06 LAB
ALFENTANIL UR QUANT: SIGNIFICANT CHANGE UP NG/MG CREAT
BUPRENORPHINE UR CONFIRMATION: NEGATIVE — SIGNIFICANT CHANGE UP
BUPRENORPHINE UR QUANT: SIGNIFICANT CHANGE UP NG/MG CREAT
CODEINE UR CFM-MCNC: SIGNIFICANT CHANGE UP NG/MG CREAT
DHC UR-MCNC: SIGNIFICANT CHANGE UP NG/MG CREAT
EDDP UR CFM-MCNC: SIGNIFICANT CHANGE UP NG/MG CREAT
FENTANYL UR CFM-MCNC: NEGATIVE — SIGNIFICANT CHANGE UP
FENTANYL UR CFM-MCNC: SIGNIFICANT CHANGE UP NG/MG CREAT
HYDROCODONE UR CFM-MCNC: SIGNIFICANT CHANGE UP NG/MG CREAT
HYDROMORPHONE UR CFM-MCNC: SIGNIFICANT CHANGE UP NG/MG CREAT
METHADONE UR CFM-MCNC: NEGATIVE — SIGNIFICANT CHANGE UP
METHADONE UR CFM-MCNC: SIGNIFICANT CHANGE UP NG/MG CREAT
MORPHINE UR CFM-MCNC: 1358 NG/MG CREAT — SIGNIFICANT CHANGE UP
NORBUPRENORPHINE QUANT UR: SIGNIFICANT CHANGE UP NG/MG CREAT
NORCODEINE UR-MCNC: SIGNIFICANT CHANGE UP NG/MG CREAT
NORFENTANYL UR-MCNC: SIGNIFICANT CHANGE UP NG/MG CREAT
NORHYDROCODONE UR CFM-MCNC: SIGNIFICANT CHANGE UP NG/MG CREAT
NOROXYCODONE UR CFM-MCNC: 216 NG/MG CREAT — SIGNIFICANT CHANGE UP
NOROXYMORPHONE UR CFM-MCNC: SIGNIFICANT CHANGE UP NG/MG CREAT
OPIATES UR CFM-MCNC: (no result)
OXYCODONE UR-MCNC: (no result)
OXYCODONE UR-MCNC: 57 NG/MG CREAT — SIGNIFICANT CHANGE UP
OXYMORPHONE UR CFM-MCNC: 36 NG/MG CREAT — SIGNIFICANT CHANGE UP
SUFENTANIL UR QUANT: SIGNIFICANT CHANGE UP NG/MG CREAT
TAPENTADOL UR CONFIRMATION: NEGATIVE — SIGNIFICANT CHANGE UP
TAPENTADOL UR QUANT: SIGNIFICANT CHANGE UP NG/MG CREAT

## 2018-07-12 ENCOUNTER — APPOINTMENT (OUTPATIENT)
Dept: SURGERY | Facility: CLINIC | Age: 28
End: 2018-07-12
Payer: COMMERCIAL

## 2018-07-12 ENCOUNTER — OUTPATIENT (OUTPATIENT)
Dept: OUTPATIENT SERVICES | Facility: HOSPITAL | Age: 28
LOS: 1 days | Discharge: HOME | End: 2018-07-12

## 2018-07-12 VITALS
DIASTOLIC BLOOD PRESSURE: 86 MMHG | HEIGHT: 69 IN | SYSTOLIC BLOOD PRESSURE: 124 MMHG | WEIGHT: 231 LBS | BODY MASS INDEX: 34.21 KG/M2

## 2018-07-12 DIAGNOSIS — M25.561 PAIN IN RIGHT KNEE: ICD-10-CM

## 2018-07-12 PROCEDURE — 99212 OFFICE O/P EST SF 10 MIN: CPT

## 2019-02-02 ENCOUNTER — OUTPATIENT (OUTPATIENT)
Dept: OUTPATIENT SERVICES | Facility: HOSPITAL | Age: 29
LOS: 1 days | Discharge: HOME | End: 2019-02-02

## 2019-02-02 DIAGNOSIS — R05 COUGH: ICD-10-CM

## 2019-02-02 DIAGNOSIS — Z20.1 CONTACT WITH AND (SUSPECTED) EXPOSURE TO TUBERCULOSIS: ICD-10-CM

## 2019-05-29 ENCOUNTER — APPOINTMENT (OUTPATIENT)
Dept: NEUROSURGERY | Facility: CLINIC | Age: 29
End: 2019-05-29
Payer: COMMERCIAL

## 2019-05-29 VITALS — WEIGHT: 195 LBS | BODY MASS INDEX: 28.88 KG/M2 | HEIGHT: 69 IN

## 2019-05-29 DIAGNOSIS — Z78.9 OTHER SPECIFIED HEALTH STATUS: ICD-10-CM

## 2019-05-29 DIAGNOSIS — M47.816 SPONDYLOSIS W/OUT MYELOPATHY OR RADICULOPATHY, LUMBAR REGION: ICD-10-CM

## 2019-05-29 DIAGNOSIS — F17.200 NICOTINE DEPENDENCE, UNSPECIFIED, UNCOMPLICATED: ICD-10-CM

## 2019-05-29 DIAGNOSIS — M51.16 INTERVERTEBRAL DISC DISORDERS WITH RADICULOPATHY, LUMBAR REGION: ICD-10-CM

## 2019-05-29 DIAGNOSIS — M25.561 PAIN IN RIGHT KNEE: ICD-10-CM

## 2019-05-29 DIAGNOSIS — S22.41XA MULTIPLE FRACTURES OF RIBS, RIGHT SIDE, INITIAL ENCOUNTER FOR CLOSED FRACTURE: ICD-10-CM

## 2019-05-29 PROCEDURE — 99205 OFFICE O/P NEW HI 60 MIN: CPT

## 2019-05-29 RX ORDER — OXYCODONE HYDROCHLORIDE 15 MG/1
15 TABLET ORAL
Refills: 0 | Status: ACTIVE | COMMUNITY

## 2019-05-29 RX ORDER — IBUPROFEN 800 MG/1
TABLET, FILM COATED ORAL
Refills: 0 | Status: ACTIVE | COMMUNITY

## 2019-05-29 NOTE — ASSESSMENT
[FreeTextEntry1] : We have had a thorough discussion regarding his current condition, findings, and treatment options. He is aware of his MRI findings. We discussed the option of a minimally invasive L5/S1 microdiscectomy. He is going to consider this option. Due to personal reasons he wants ton hold off for now. He is aware that prior to surgery I would recommend that he undergo a new MRI of the lumbar spine without gado. He will notify me if he would like to proceed.

## 2019-05-29 NOTE — PHYSICAL EXAM
[General Appearance - Alert] : alert [Oriented To Time, Place, And Person] : oriented to person, place, and time [General Appearance - Well-Appearing] : healthy appearing [General Appearance - Well Nourished] : well nourished [No Pain with ROM] : no pain with motion in any direction [Normal] : normal [Straight-Leg Raise Test - Right] : straight leg raise of the right leg was positive [Able to heel walk] : the patient was able to heel walk [Able to toe walk] : the patient was able to toe walk [Intact] : all reflexes within normal limits bilaterally [Straight-Leg Raise Test - Left] : straight leg raise of the left leg was negative
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
## 2019-05-29 NOTE — DATA REVIEWED
[de-identified] : \par - MRI of the lumbar spine from 8/26/18 was reviewed today. He is found to have a large right paracentral disc herniation with degenerative changes at this segment.

## 2020-07-29 ENCOUNTER — EMERGENCY (EMERGENCY)
Facility: HOSPITAL | Age: 30
LOS: 0 days | Discharge: HOME | End: 2020-07-29
Attending: EMERGENCY MEDICINE | Admitting: EMERGENCY MEDICINE
Payer: COMMERCIAL

## 2020-07-29 VITALS
TEMPERATURE: 100 F | HEIGHT: 70 IN | DIASTOLIC BLOOD PRESSURE: 71 MMHG | OXYGEN SATURATION: 96 % | HEART RATE: 84 BPM | RESPIRATION RATE: 18 BRPM | WEIGHT: 207.9 LBS | SYSTOLIC BLOOD PRESSURE: 142 MMHG

## 2020-07-29 DIAGNOSIS — L08.89 OTHER SPECIFIED LOCAL INFECTIONS OF THE SKIN AND SUBCUTANEOUS TISSUE: ICD-10-CM

## 2020-07-29 DIAGNOSIS — Z48.00 ENCOUNTER FOR CHANGE OR REMOVAL OF NONSURGICAL WOUND DRESSING: ICD-10-CM
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
## 2020-07-29 DIAGNOSIS — S30.860A INSECT BITE (NONVENOMOUS) OF LOWER BACK AND PELVIS, INITIAL ENCOUNTER: ICD-10-CM

## 2020-07-29 LAB
B BURGDOR C6 AB SER-ACNC: NEGATIVE — SIGNIFICANT CHANGE UP
B BURGDOR IGG+IGM SER-ACNC: 0.5 INDEX — SIGNIFICANT CHANGE UP (ref 0.01–0.89)

## 2020-07-29 PROCEDURE — 99283 EMERGENCY DEPT VISIT LOW MDM: CPT

## 2020-07-29 NOTE — ED PROVIDER NOTE - NS ED ROS FT
GEN: (-) fever, (-) chills, (-) malaise  HEENT: (-) vision changes, (-) HA, (-) sore throat, (-) ear pain  CV: (-) chest pain  PULM: (-) cough, (-) dyspnea  GI: (-) abdominal pain,(-) Nausea, (-) Vomiting, (-) Diarrhea  NEURO: (-) weakness, (-) paresthesias  : (-) dysuria, (-) frequency, (-) urgency  MS: (-) back pain, (-) joint pain, (-)myalgias, (-) swelling  SKIN: (-) rashes, (+) new lesions, (-) pruritus  HEME: (-) bleeding, (-) ecchymosis

## 2020-07-29 NOTE — ED PROVIDER NOTE - CLINICAL SUMMARY MEDICAL DECISION MAKING FREE TEXT BOX
Plan- PO doxy for 10 days. Will test for lyme. If no improvement patient should follow-up w Dr. Singh for further work-up. Patient agreed w the plan and verbalized understanding. Full DC instructions discussed and patient knows when to seek immediate medical attention. Patient has proper follow-up. All results discussed with the patient they may require further work-up. Limitations of ED work-up discussed. All  questions and concerns from patient or family addressed. Understanding of insturctions verbalized

## 2020-07-29 NOTE — ED PROVIDER NOTE - ATTENDING CONTRIBUTION TO CARE
I personally evaluated the patient. I reviewed the Resident’s or Physician Assistant’s note (as assigned above), and agree with the findings and plan except as documented in my note.    31 y/o M with no PMH is presenting to ED for lesion on back x 2 wks. Pt states he had a tick burrow in his back 2-3wks ago. He states he had it removed,. Now states he noticed a bump on his back w some surrounding redness. No joint pain, fevers. No neck pain. No CP, SOB.  He took a 5 day cource of doxy prescribed by urgent care.     CONSTITUTIONAL: Well-developed; well-nourished; in no acute distress. Sitting up and providing appropriate history and physical examination  SKIN: skin exam is warm. R mid back nodule w slight surrounding erythema  HEAD: Normocephalic; atraumatic.  EYES: PERRL, 3 mm bilateral, no nystagmus, EOM intact; conjunctiva and sclera clear.  ENT: No nasal discharge; airway clear.  NECK: Supple; non tender.+ full passive ROM in all directions. No JVD  CARD: S1, S2 normal; no murmurs, gallops, or rubs. Regular rate and rhythm. + Symmetric Strong Pulses  RESP: No wheezes, rales or rhonchi. Good air movement bilaterally  ABD: soft; non-distended; non-tender. No Rebound, No Gaurding, No signs of peritnitis, No CVA tenderness    Plan- PO doxy for 10 days. Will test for lyme. If no improvement patient should follow-up w Dr. Singh for further work-up. Patient agreed w the plan and verbalized understanding.

## 2020-07-29 NOTE — ED PROVIDER NOTE - OBJECTIVE STATEMENT
The Pt is a 30y M with no PMH is presenting to ED for lesion on back x 2 wks. Pt states he had a tick burrow in his back 2-3wks ago. He states he had it removed, but unsure if all of it was removed. He states he had a 5 day course of doxycycline. Pt states it has been a hard lesion and he noticed a red streak coming from the lesion 1 day ago. Pt denies pain, fever, drainage, back pain, warmth, pruritis, other rashes.

## 2020-07-29 NOTE — ED PROVIDER NOTE - PHYSICAL EXAMINATION
GEN: Alert & Oriented x 3, No acute distress. Calm, appropriate.  Head and Neck: Normocephalic, atraumatic.  ENT:Oral mucosa pink, moist without lesions.   RESP: Lungs clear to auscult bilat. no wheezes, rhonchi or rales. No retractions. Equal air entry.  CARDIO: regular rate and rhythm, no murmurs, rubs or gallops. Normal S1, S2.   MS: Grossly moving ext.   SKIN: Sm hard lesion noted to right back with slight erythema. no areas of fluctuance or red streaking up back. no warmth or tenderness with palpation.  NEURO: CN II-XII grossly intact. Speech and cognition normal.

## 2020-07-29 NOTE — ED PROVIDER NOTE - CARE PROVIDER_API CALL
Krystian Lopez  Infectious Disease  [address] [place] [address]  Phone: [phone]  Fax: [phone]  Follow Up Time: 4-6 Days

## 2020-07-29 NOTE — ED PROVIDER NOTE - NS_EDPROVIDERDISPOUSERTYPE_ED_A_ED
Patient continues to have 3 way espino bladder irrigation- patient states it's terrible- no bleeding from meatus noted. Tubing has light pink to dark pink drainage without clots ( which is an improvement from prior status).    Attending Attestation (For Attendings USE Only)...

## 2020-08-07 LAB — B BURGDOR DNA SPEC QL NAA+PROBE: NEGATIVE — SIGNIFICANT CHANGE UP

## 2020-12-11 NOTE — DISCHARGE NOTE ADULT - PRINCIPAL DIAGNOSIS
normal appearance , without tenderness upon palpation , no deformities , trachea midline , Thyroid normal size , no thyroid nodules , no masses , no JVD , thyroid nontender
Closed fracture of multiple ribs of right side, initial encounter

## 2021-02-28 ENCOUNTER — EMERGENCY (EMERGENCY)
Facility: HOSPITAL | Age: 31
LOS: 0 days | Discharge: HOME | End: 2021-02-28
Attending: EMERGENCY MEDICINE | Admitting: EMERGENCY MEDICINE
Payer: COMMERCIAL

## 2021-02-28 VITALS
HEIGHT: 70 IN | RESPIRATION RATE: 17 BRPM | TEMPERATURE: 99 F | WEIGHT: 220.02 LBS | DIASTOLIC BLOOD PRESSURE: 79 MMHG | OXYGEN SATURATION: 98 % | SYSTOLIC BLOOD PRESSURE: 141 MMHG | HEART RATE: 69 BPM
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
## 2021-02-28 DIAGNOSIS — M79.603 PAIN IN ARM, UNSPECIFIED: ICD-10-CM

## 2021-02-28 DIAGNOSIS — Z79.899 OTHER LONG TERM (CURRENT) DRUG THERAPY: ICD-10-CM
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
## 2021-02-28 DIAGNOSIS — S80.11XA CONTUSION OF RIGHT LOWER LEG, INITIAL ENCOUNTER: ICD-10-CM

## 2021-02-28 PROCEDURE — 99283 EMERGENCY DEPT VISIT LOW MDM: CPT

## 2021-02-28 PROCEDURE — 73590 X-RAY EXAM OF LOWER LEG: CPT | Mod: 26,RT

## 2021-02-28 NOTE — ED PROVIDER NOTE - OBJECTIVE STATEMENT
31 yo M with no significant PMHx presents to the ED c/o mild right leg pain s/p injury on Thursday. Pt was playing hockey and fell hitting the right shin. Pt states he came in today because he noted that his right leg is more swollen than the left. Pt admits to associated abrasion to area. He denies other complaints. He is up to date with tetanus. Pt denies fever, chills, chest pain, SOB, back pain, LOC, calf pain, recent travel, recent surgery.

## 2021-02-28 NOTE — ED PROVIDER NOTE - PATIENT PORTAL LINK FT
You can access the FollowMyHealth Patient Portal offered by [facility] by registering at the following website: [email or web address]. By joining [employer]’s FollowMyHealth portal, you will also be able to view your health information using other applications (apps) compatible with our system.

## 2021-02-28 NOTE — ED PROVIDER NOTE - NS ED ROS FT
Review of Systems  Constitutional:  No fever, chills,.  Cardiac:  No chest pain, palpitations, syncope, or edema.  Respiratory:  No dyspnea. No hemoptysis.  MS:  No back pain. (+) right leg pain  Skin:  No skin rash, pruritis, jaundice, or lesions. (+) abrasion  Neuro:  No headache, dizziness, loss of sensation, or focal weakness.  No change in mental status.   Endocrine: No history of thyroid disease or diabetes.

## 2021-02-28 NOTE — ED PROVIDER NOTE - CARE PROVIDER_API CALL
Reg Duval (MD)  Orthopaedic Surgery  [address] [place] [address]  Phone: [phone]  Fax: [phone]  Follow Up Time: 4-6 Days

## 2021-02-28 NOTE — ED PROVIDER NOTE - CLINICAL SUMMARY MEDICAL DECISION MAKING FREE TEXT BOX
30yM p/w RLE swelling after recent minor injury.  Xray w/o fx or dislocation.  Exam w/ well healing skin abrasions w/o surrounding cellulitis or underlying crepitus.  Pt NVI w/o concern for necrotizing infection or developing compartment syndrome.  Pt low risk for VTE and clinically unlikely to have DVT based on hx and current presentation.  Recommend supportive care, close f/u if swelling not improving, return precautions

## 2021-02-28 NOTE — ED PROVIDER NOTE - NSFOLLOWUPINSTRUCTIONS_ED_ALL_ED_FT
Musculoskeletal Pain    Musculoskeletal pain is muscle and bone aches and pains. This pain can occur in any part of the body.    Follow these instructions at home:  Only take medicines for pain, discomfort, or fever as told by your health care provider.  You may continue all activities unless the activities cause more pain. When the pain lessens, slowly resume normal activities. Gradually increase the intensity and duration of the activities or exercise.  During periods of severe pain, bed rest may be helpful. Lie or sit in any position that is comfortable, but get out of bed and walk around at least every several hours.  If directed, put ice on the injured area.    Put ice in a plastic bag.  Place a towel between your skin and the bag.  Leave the ice on for 20 minutes, 2–3 times a day.    Contact a health care provider if:  Your pain is getting worse.  Your pain is not relieved with medicines.  You lose function in the area of the pain if the pain is in your arms, legs, or neck.  This information is not intended to replace advice given to you by your health care provider. Make sure you discuss any questions you have with your health care provider.    Sprain    A sprain is a stretch or tear in one of the tough, fiber-like tissues (ligaments) in your body. This is caused by an injury to the area such as a twisting mechanism. Symptoms include pain, swelling, or bruising. Rest that area over the next several days and slowly resume activity when tolerated. Ice can help with swelling and pain.     SEEK IMMEDIATE MEDICAL CARE IF YOU HAVE ANY OF THE FOLLOWING SYMPTOMS: worsening pain, inability to move that body part, numbness or tingling.

## 2021-02-28 NOTE — ED ADULT TRIAGE NOTE - CHIEF COMPLAINT QUOTE
I fell on my leg on Thursday playing hockey, I have pain on the side of my shin and some scrapes, I have some swelling - patient

## 2021-03-01 ENCOUNTER — EMERGENCY (EMERGENCY)
Facility: HOSPITAL | Age: 31
LOS: 0 days | Discharge: HOME | End: 2021-03-01
Attending: EMERGENCY MEDICINE | Admitting: EMERGENCY MEDICINE
Payer: COMMERCIAL

## 2021-03-01 VITALS
HEART RATE: 78 BPM | RESPIRATION RATE: 18 BRPM | SYSTOLIC BLOOD PRESSURE: 153 MMHG | DIASTOLIC BLOOD PRESSURE: 83 MMHG | WEIGHT: 220.02 LBS | HEIGHT: 70 IN | OXYGEN SATURATION: 98 %

## 2021-03-01 VITALS — TEMPERATURE: 98 F
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
## 2021-03-01 DIAGNOSIS — S80.811A ABRASION, RIGHT LOWER LEG, INITIAL ENCOUNTER: ICD-10-CM

## 2021-03-01 DIAGNOSIS — R60.0 LOCALIZED EDEMA: ICD-10-CM
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
## 2021-03-01 DIAGNOSIS — M79.89 OTHER SPECIFIED SOFT TISSUE DISORDERS: ICD-10-CM

## 2021-03-01 PROCEDURE — 93010 ELECTROCARDIOGRAM REPORT: CPT

## 2021-03-01 PROCEDURE — 93970 EXTREMITY STUDY: CPT | Mod: 26

## 2021-03-01 PROCEDURE — 71046 X-RAY EXAM CHEST 2 VIEWS: CPT | Mod: 26

## 2021-03-01 PROCEDURE — 99285 EMERGENCY DEPT VISIT HI MDM: CPT

## 2021-03-01 NOTE — ED PROVIDER NOTE - NS ED ROS FT
Constitutional: See HPI.  Cardiac: No SOB or edema. No chest pain with exertion.  Respiratory: No cough or respiratory distress.   GI: No nausea, vomiting, diarrhea or abdominal pain.  : No dysuria, frequency or burning. No Discharge  MS: + RLE edema. No myalgia, muscle weakness, joint pain or back pain.  Neuro: No headache or weakness  Skin: + abrasion R shin.   Except as documented in the HPI, all other systems are negative.

## 2021-03-01 NOTE — ED PROVIDER NOTE - CLINICAL SUMMARY MEDICAL DECISION MAKING FREE TEXT BOX
pt here with lower ext edema, duplex done to r/o dvt. ekg done as pt having palpitations.  no sign of cellulitis to leg. Will maribell.

## 2021-03-01 NOTE — ED PROVIDER NOTE - NSFOLLOWUPINSTRUCTIONS_ED_ALL_ED_FT
LEG EDEMA - AfterCare(R) Instructions(ER/ED)     Leg Edema    WHAT YOU NEED TO KNOW:    Leg edema is swelling caused by fluid buildup. Your legs may swell if you sit or stand for long periods of time, are pregnant, or are injured. Swelling may also occur if you have heart failure or circulation problems. This means that your heart does not pump blood through your body as it should.    DISCHARGE INSTRUCTIONS:    Self-care:     Elevate your legs: Raise your legs above the level of your heart as often as you can. This will help decrease swelling and pain. Prop your legs on pillows or blankets to keep them elevated comfortably.      Wear pressure stockings: These tight stockings put pressure on your legs to promote blood flow and prevent blood clots. Wear the stockings during the day. Do not wear them while you sleep.      Apply heat: Heat helps decrease pain and swelling. Apply heat on the area for 20 to 30 minutes every 2 hours for as many days as directed.       Stay active: Do not stand or sit for long periods of time. Ask your healthcare provider about the best exercise plan for you.      Eat healthy foods: Healthy foods include fruits, vegetables, whole-grain breads, low-fat dairy products, beans, lean meats, and fish. Ask if you need to be on a special diet. Limit salt. Salt will make your body hold even more fluid.    Follow up with your healthcare provider as directed: Write down your questions so you remember to ask them during your visits.     Contact your healthcare provider if:     You have a fever or feel more tired than usual.      The veins in your legs look larger than usual. They may look full or bulging.      Your legs itch or feel heavy.      You have red or white areas or sores on your legs. The skin may also appear dimpled or have indentations.      You are gaining weight.      You have trouble moving your ankles.      The swelling does not go away, or other parts of your body swell.      You have questions or concerns about your condition or care.    Return to the emergency department if:     You cannot walk.      You feel faint or confused.       Your skin turns blue or gray.      Your leg feels warm, tender, and painful. It may be swollen and red.      You have chest pain or trouble breathing that is worse when you lie down.      You suddenly feel lightheaded and have trouble breathing.      You have new and sudden chest pain. You may have more pain when you take deep breaths or cough. You may also cough up blood    Abrasion    WHAT YOU NEED TO KNOW:    An abrasion is a scrape on your skin. It happens when your skin rubs against a rough surface. Some examples of an abrasion include rug burn, a skinned elbow, or road rash. Abrasions can be many shapes and sizes. The wound may hurt, bleed, bruise, or swell.     DISCHARGE INSTRUCTIONS:    Return to the emergency department if:     The bleeding does not stop after 10 minutes of firm pressure.      You cannot rinse one or more foreign objects out of your wound.      You have red streaks on your skin coming from your wound.    Contact your healthcare provider if:     You have a fever or chills.       Your abrasion is red, warm, swollen, or draining pus.      You have questions or concerns about your condition or care.    Care for your abrasion:     Wash your hands and dry them with a clean towel.      Press a clean cloth against your wound to stop any bleeding.      Rinse your wound with a lot of clean water. Do not use harsh soap, alcohol, or iodine solutions.      Use a clean, wet cloth to remove any objects, such as small pieces of rocks or dirt.      Rub antibiotic ointment on your wound. This may help prevent infection and help your wound heal.      Cover the wound with a non-stick bandage. Change the bandage daily, and if gets wet or dirty.     Follow up with your healthcare provider as directed: Write down your questions so you remember to ask them during your visits.    Follow up with your primary medical doctor in 1-2 days

## 2021-03-01 NOTE — ED PROVIDER NOTE - CARE PROVIDER_API CALL
Lisandro Singh  INTERNAL MEDICINE  [address] [place] [address], [address]  [place] [address]  Phone: [phone]  Fax: [phone]  Follow Up Time: 1-3 Days

## 2021-03-01 NOTE — ED PROVIDER NOTE - OBJECTIVE STATEMENT
30 y.o male w/ no sig pmhx presents to the ED for evaluation of R leg edema.  Injured leg on Thursday.  Since then progressively worsening edema of RLE, tight, achy, worse in dependent position, no radiation of pain. Concerned about DVT prompting visit to the ED.  Denies chest pain, dyspnea, hemoptysis, calf pain, fever, chills. UTD on tdap

## 2021-03-01 NOTE — ED PROVIDER NOTE - PHYSICAL EXAMINATION
CONST: Well appearing in NAD  EYES: Sclera and conjunctiva clear.  CARD: Normal S1 S2; Normal rate and rhythm  RESP: Equal BS B/L, No wheezes, rhonchi or rales. No distress  MS: 1+ edema RLe, no calf pain, no palpable cords, Normal ROM in all extremities. radial pulses 2+ bilaterally  SKIN: healing superficial abrasions R shin, no redness/drainage/discharge  NEURO: A&Ox3, No focal deficits. Strength 5/5 with no sensory deficits. Steady gait

## 2021-03-01 NOTE — ED PROVIDER NOTE - ATTENDING CONTRIBUTION TO CARE
30 y.o male w/ no sig pmhx here for eval of right leg edema. pt reports worsening swelling to leg.  pt was seen in ed yesterday and advised to return if worsen to r/o DVT. pt has no fever, chills, chest pain. occasional palpatations. agree with above exam 30 y.o male w/ no sig pmhx here for eval of right leg edema. pt reports worsening swelling to leg.  pt was seen in ed yesterday and advised to return if worsen to r/o DVT. pt has no fever, chills, chest pain. occasional palpitations. agree with above exam

## 2021-03-01 NOTE — ED PROVIDER NOTE - PATIENT PORTAL LINK FT
You can access the FollowMyHealth Patient Portal offered by [facility] by registering at the following website: [email or web address]. By joining [employer]’s FollowMyHealth portal, you will also be able to view your health information using other applications (apps) compatible with our system.

## 2021-03-01 NOTE — ED ADULT NURSE NOTE - OBJECTIVE STATEMENT
31 y/o male brought in for right leg swelling since Thursday s/p an injury. patient sent in for r/o dvt.

## 2021-03-18 NOTE — ED ADULT NURSE NOTE - OBJECTIVE STATEMENT
17-Mar-2021 22:00
Pt states, "I fell on my leg on Thursday playing hockey, I have pain on the side of my shin and some scrapes, I have some swelling." pt evaluated and dc with follow up instruction.  pt left in no distress.

## 2021-06-13 ENCOUNTER — EMERGENCY (EMERGENCY)
Facility: HOSPITAL | Age: 31
LOS: 0 days | Discharge: HOME | End: 2021-06-14
Attending: EMERGENCY MEDICINE | Admitting: EMERGENCY MEDICINE
Payer: COMMERCIAL

## 2021-06-13 VITALS
TEMPERATURE: 98 F | SYSTOLIC BLOOD PRESSURE: 143 MMHG | HEART RATE: 75 BPM | DIASTOLIC BLOOD PRESSURE: 75 MMHG | HEIGHT: 70 IN | OXYGEN SATURATION: 99 % | RESPIRATION RATE: 17 BRPM | WEIGHT: 214.95 LBS

## 2021-06-13 DIAGNOSIS — R07.89 OTHER CHEST PAIN: ICD-10-CM

## 2021-06-13 DIAGNOSIS — Z87.891 PERSONAL HISTORY OF NICOTINE DEPENDENCE: ICD-10-CM

## 2021-06-13 LAB
BASOPHILS # BLD AUTO: 0.02 K/UL — SIGNIFICANT CHANGE UP (ref 0–0.2)
BASOPHILS NFR BLD AUTO: 0.3 % — SIGNIFICANT CHANGE UP (ref 0–1)
EOSINOPHIL # BLD AUTO: 0.25 K/UL — SIGNIFICANT CHANGE UP (ref 0–0.7)
EOSINOPHIL NFR BLD AUTO: 3.3 % — SIGNIFICANT CHANGE UP (ref 0–8)
HCT VFR BLD CALC: 43.7 % — SIGNIFICANT CHANGE UP (ref 42–52)
HGB BLD-MCNC: 14 G/DL — SIGNIFICANT CHANGE UP (ref 14–18)
IMM GRANULOCYTES NFR BLD AUTO: 0.3 % — SIGNIFICANT CHANGE UP (ref 0.1–0.3)
LYMPHOCYTES # BLD AUTO: 1.81 K/UL — SIGNIFICANT CHANGE UP (ref 1.2–3.4)
LYMPHOCYTES # BLD AUTO: 23.9 % — SIGNIFICANT CHANGE UP (ref 20.5–51.1)
MCHC RBC-ENTMCNC: 27.5 PG — SIGNIFICANT CHANGE UP (ref 27–31)
MCHC RBC-ENTMCNC: 32 G/DL — SIGNIFICANT CHANGE UP (ref 32–37)
MCV RBC AUTO: 85.9 FL — SIGNIFICANT CHANGE UP (ref 80–94)
MONOCYTES # BLD AUTO: 0.76 K/UL — HIGH (ref 0.1–0.6)
MONOCYTES NFR BLD AUTO: 10 % — HIGH (ref 1.7–9.3)
NEUTROPHILS # BLD AUTO: 4.72 K/UL — SIGNIFICANT CHANGE UP (ref 1.4–6.5)
NEUTROPHILS NFR BLD AUTO: 62.2 % — SIGNIFICANT CHANGE UP (ref 42.2–75.2)
NRBC # BLD: 0 /100 WBCS — SIGNIFICANT CHANGE UP (ref 0–0)
PLATELET # BLD AUTO: 223 K/UL — SIGNIFICANT CHANGE UP (ref 130–400)
RBC # BLD: 5.09 M/UL — SIGNIFICANT CHANGE UP (ref 4.7–6.1)
RBC # FLD: 12.2 % — SIGNIFICANT CHANGE UP (ref 11.5–14.5)
WBC # BLD: 7.58 K/UL — SIGNIFICANT CHANGE UP (ref 4.8–10.8)
WBC # FLD AUTO: 7.58 K/UL — SIGNIFICANT CHANGE UP (ref 4.8–10.8)

## 2021-06-13 PROCEDURE — 93010 ELECTROCARDIOGRAM REPORT: CPT

## 2021-06-13 PROCEDURE — 99284 EMERGENCY DEPT VISIT MOD MDM: CPT

## 2021-06-14 LAB
ALBUMIN SERPL ELPH-MCNC: 4.7 G/DL — SIGNIFICANT CHANGE UP (ref 3.5–5.2)
ALP SERPL-CCNC: 85 U/L — SIGNIFICANT CHANGE UP (ref 30–115)
ALT FLD-CCNC: 39 U/L — SIGNIFICANT CHANGE UP (ref 0–41)
ANION GAP SERPL CALC-SCNC: 9 MMOL/L — SIGNIFICANT CHANGE UP (ref 7–14)
AST SERPL-CCNC: 30 U/L — SIGNIFICANT CHANGE UP (ref 0–41)
BILIRUB SERPL-MCNC: 0.2 MG/DL — SIGNIFICANT CHANGE UP (ref 0.2–1.2)
BUN SERPL-MCNC: 15 MG/DL — SIGNIFICANT CHANGE UP (ref 10–20)
CALCIUM SERPL-MCNC: 9.6 MG/DL — SIGNIFICANT CHANGE UP (ref 8.5–10.1)
CHLORIDE SERPL-SCNC: 101 MMOL/L — SIGNIFICANT CHANGE UP (ref 98–110)
CO2 SERPL-SCNC: 28 MMOL/L — SIGNIFICANT CHANGE UP (ref 17–32)
CREAT SERPL-MCNC: 1.1 MG/DL — SIGNIFICANT CHANGE UP (ref 0.7–1.5)
GLUCOSE SERPL-MCNC: 99 MG/DL — SIGNIFICANT CHANGE UP (ref 70–99)
MAGNESIUM SERPL-MCNC: 2 MG/DL — SIGNIFICANT CHANGE UP (ref 1.8–2.4)
NT-PROBNP SERPL-SCNC: 21 PG/ML — SIGNIFICANT CHANGE UP (ref 0–300)
POTASSIUM SERPL-MCNC: 3.8 MMOL/L — SIGNIFICANT CHANGE UP (ref 3.5–5)
POTASSIUM SERPL-SCNC: 3.8 MMOL/L — SIGNIFICANT CHANGE UP (ref 3.5–5)
PROT SERPL-MCNC: 6.7 G/DL — SIGNIFICANT CHANGE UP (ref 6–8)
SODIUM SERPL-SCNC: 138 MMOL/L — SIGNIFICANT CHANGE UP (ref 135–146)
TROPONIN T SERPL-MCNC: <0.01 NG/ML — SIGNIFICANT CHANGE UP

## 2021-06-14 NOTE — ED ADULT NURSE NOTE - NSIMPLEMENTINTERV_GEN_ALL_ED
Implemented All Universal Safety Interventions:  [place] to call system. Call bell, personal items and telephone within reach. Instruct patient to call for assistance. Room bathroom lighting operational. Non-slip footwear when patient is off stretcher. Physically safe environment: no spills, clutter or unnecessary equipment. Stretcher in lowest position, wheels locked, appropriate side rails in place.

## 2021-06-14 NOTE — ED PROVIDER NOTE - NS ED ROS FT
Eyes:  No visual changes, eye pain or discharge.  ENMT:  No hearing changes, pain, discharge or infections. No neck pain or stiffness.  Cardiac:  +chest pain. no SOB or edema. No chest pain with exertion.  Respiratory:  No cough or respiratory distress. No hemoptysis. No history of asthma or RAD.  GI:  No nausea, vomiting, diarrhea or abdominal pain.  :  No dysuria, frequency or burning.  MS:  No myalgia, muscle weakness, joint pain or back pain.  Neuro:  No headache or weakness.  No LOC.  Skin:  No skin rash.   Endocrine: No history of thyroid disease or diabetes.

## 2021-06-14 NOTE — ED PROVIDER NOTE - ATTENDING CONTRIBUTION TO CARE
32 yo M no pmh presents with chest pain. States that aronud 7pm after eating dinner started to have left sided chest pressure, non radiating, intermittent, lasted for 30 minutes then resolved. no shortness of breath, no palpitations. no n/v, no abdominal pain. no leg swelling or pain. States that he had a similar episode 2 weeks ago, went ot the hospital at that time and had a normal xray, ekg, and lab work. Was told to follow up with cardiology, states that he is in the process of setting up apt but has not yet done so. + smoker, no family hx of heart disease.     CONSTITUTIONAL: Well-developed; well-nourished; in no acute distress.   SKIN: warm, dry  HEAD: Normocephalic; atraumatic.  EYES: PERRL, EOMI, no conjunctival erythema  ENT: No nasal discharge; airway clear.  NECK: Supple; non tender.  CARD: S1, S2 normal;  Regular rate and rhythm.   RESP: No wheezes, rales or rhonchi.  ABD: soft non tender, non distended, no rebound or guarding  EXT: Normal ROM.  5/5 strength in all 4 extremities   LYMPH: No acute cervical adenopathy.  NEURO: Alert, oriented, grossly unremarkable. neurovascularly intact  PSYCH: Cooperative, appropriate.

## 2021-06-14 NOTE — ED PROVIDER NOTE - CARE PROVIDER_API CALL
Jean Paniagua)  Cardiology; Internal Medicine  [address] [place] [address]  Phone: [phone]  Fax: [phone]  Follow Up Time:

## 2021-06-14 NOTE — ED PROVIDER NOTE - OBJECTIVE STATEMENT
pt is a 31 yom w/ no pmhx smoker here for acute onset chest pain today at 7pm while walking dog. pt states it was constant, heavy pressure-like, happened before 2 weeks ago and pt went to ED with negative workup. denies SOB, cough, fever, abd pain, n/v/d

## 2021-06-14 NOTE — ED PROVIDER NOTE - CLINICAL SUMMARY MEDICAL DECISION MAKING FREE TEXT BOX
Patient presents with chest pain. labs, ekg done. no acute findings. CXR offered but patient does not want it at this time since he had a normal one 1 week prior. Discharged with cardiology follow up and return precautions discussed.

## 2021-08-19 ENCOUNTER — EMERGENCY (EMERGENCY)
Facility: HOSPITAL | Age: 31
LOS: 0 days | Discharge: HOME | End: 2021-08-19
Attending: EMERGENCY MEDICINE | Admitting: EMERGENCY MEDICINE
Payer: COMMERCIAL

## 2021-08-19 VITALS
SYSTOLIC BLOOD PRESSURE: 130 MMHG | TEMPERATURE: 99 F | HEIGHT: 70 IN | HEART RATE: 80 BPM | OXYGEN SATURATION: 97 % | DIASTOLIC BLOOD PRESSURE: 77 MMHG | RESPIRATION RATE: 9 BRPM | WEIGHT: 214.95 LBS

## 2021-08-19 DIAGNOSIS — R07.89 OTHER CHEST PAIN: ICD-10-CM

## 2021-08-19 DIAGNOSIS — Z87.891 PERSONAL HISTORY OF NICOTINE DEPENDENCE: ICD-10-CM

## 2021-08-19 DIAGNOSIS — R10.13 EPIGASTRIC PAIN: ICD-10-CM

## 2021-08-19 LAB
ALBUMIN SERPL ELPH-MCNC: 4.5 G/DL — SIGNIFICANT CHANGE UP (ref 3.5–5.2)
ALP SERPL-CCNC: 74 U/L — SIGNIFICANT CHANGE UP (ref 30–115)
ALT FLD-CCNC: <5 U/L — SIGNIFICANT CHANGE UP (ref 0–41)
ANION GAP SERPL CALC-SCNC: 8 MMOL/L — SIGNIFICANT CHANGE UP (ref 7–14)
AST SERPL-CCNC: 90 U/L — HIGH (ref 0–41)
BASOPHILS # BLD AUTO: 0.04 K/UL — SIGNIFICANT CHANGE UP (ref 0–0.2)
BASOPHILS NFR BLD AUTO: 0.6 % — SIGNIFICANT CHANGE UP (ref 0–1)
BILIRUB SERPL-MCNC: <0.2 MG/DL — SIGNIFICANT CHANGE UP (ref 0.2–1.2)
BUN SERPL-MCNC: 17 MG/DL — SIGNIFICANT CHANGE UP (ref 10–20)
CALCIUM SERPL-MCNC: 9 MG/DL — SIGNIFICANT CHANGE UP (ref 8.5–10.1)
CHLORIDE SERPL-SCNC: 102 MMOL/L — SIGNIFICANT CHANGE UP (ref 98–110)
CO2 SERPL-SCNC: 23 MMOL/L — SIGNIFICANT CHANGE UP (ref 17–32)
CREAT SERPL-MCNC: 1.3 MG/DL — SIGNIFICANT CHANGE UP (ref 0.7–1.5)
EOSINOPHIL # BLD AUTO: 0.22 K/UL — SIGNIFICANT CHANGE UP (ref 0–0.7)
EOSINOPHIL NFR BLD AUTO: 3.1 % — SIGNIFICANT CHANGE UP (ref 0–8)
GLUCOSE SERPL-MCNC: 101 MG/DL — HIGH (ref 70–99)
HCT VFR BLD CALC: 44.9 % — SIGNIFICANT CHANGE UP (ref 42–52)
HGB BLD-MCNC: 14.9 G/DL — SIGNIFICANT CHANGE UP (ref 14–18)
IMM GRANULOCYTES NFR BLD AUTO: 0.3 % — SIGNIFICANT CHANGE UP (ref 0.1–0.3)
LYMPHOCYTES # BLD AUTO: 2.08 K/UL — SIGNIFICANT CHANGE UP (ref 1.2–3.4)
LYMPHOCYTES # BLD AUTO: 29.8 % — SIGNIFICANT CHANGE UP (ref 20.5–51.1)
MCHC RBC-ENTMCNC: 28.9 PG — SIGNIFICANT CHANGE UP (ref 27–31)
MCHC RBC-ENTMCNC: 33.2 G/DL — SIGNIFICANT CHANGE UP (ref 32–37)
MCV RBC AUTO: 87 FL — SIGNIFICANT CHANGE UP (ref 80–94)
MONOCYTES # BLD AUTO: 0.55 K/UL — SIGNIFICANT CHANGE UP (ref 0.1–0.6)
MONOCYTES NFR BLD AUTO: 7.9 % — SIGNIFICANT CHANGE UP (ref 1.7–9.3)
NEUTROPHILS # BLD AUTO: 4.08 K/UL — SIGNIFICANT CHANGE UP (ref 1.4–6.5)
NEUTROPHILS NFR BLD AUTO: 58.3 % — SIGNIFICANT CHANGE UP (ref 42.2–75.2)
NRBC # BLD: 0 /100 WBCS — SIGNIFICANT CHANGE UP (ref 0–0)
PLATELET # BLD AUTO: 254 K/UL — SIGNIFICANT CHANGE UP (ref 130–400)
POTASSIUM SERPL-MCNC: SIGNIFICANT CHANGE UP MMOL/L (ref 3.5–5)
POTASSIUM SERPL-SCNC: SIGNIFICANT CHANGE UP MMOL/L (ref 3.5–5)
PROT SERPL-MCNC: 7.5 G/DL — SIGNIFICANT CHANGE UP (ref 6–8)
RBC # BLD: 5.16 M/UL — SIGNIFICANT CHANGE UP (ref 4.7–6.1)
RBC # FLD: 12.3 % — SIGNIFICANT CHANGE UP (ref 11.5–14.5)
SODIUM SERPL-SCNC: 133 MMOL/L — LOW (ref 135–146)
TROPONIN T SERPL-MCNC: <0.01 NG/ML — SIGNIFICANT CHANGE UP
WBC # BLD: 6.99 K/UL — SIGNIFICANT CHANGE UP (ref 4.8–10.8)
WBC # FLD AUTO: 6.99 K/UL — SIGNIFICANT CHANGE UP (ref 4.8–10.8)

## 2021-08-19 PROCEDURE — 93010 ELECTROCARDIOGRAM REPORT: CPT

## 2021-08-19 PROCEDURE — 99284 EMERGENCY DEPT VISIT MOD MDM: CPT

## 2021-08-19 RX ORDER — PANTOPRAZOLE SODIUM 20 MG/1
40 TABLET, DELAYED RELEASE ORAL ONCE
Refills: 0 | Status: DISCONTINUED | OUTPATIENT
Start: 2021-08-19 | End: 2021-08-19

## 2021-08-19 RX ORDER — FAMOTIDINE 10 MG/ML
20 INJECTION INTRAVENOUS ONCE
Refills: 0 | Status: COMPLETED | OUTPATIENT
Start: 2021-08-19 | End: 2021-08-19

## 2021-08-19 RX ADMIN — FAMOTIDINE 104 MILLIGRAM(S): 10 INJECTION INTRAVENOUS at 21:26

## 2021-08-19 NOTE — ED PROVIDER NOTE - OBJECTIVE STATEMENT
30 yo M no PMHx, smoker, c/o acute onset burning and gas 2 hours ago after eating a heavy meal and coffee. pt was laying down and started to feel burning in his chest that radiated up his throat.  pt has had similar events in the past and came to the ED with neg workup. pt has an endoscopy appt Wednesday. no CP, no SOB, denies n/v/f/d

## 2021-08-19 NOTE — ED PROVIDER NOTE - NSFOLLOWUPINSTRUCTIONS_ED_ALL_ED_FT
GERD (Gastroesophageal Reflux Disease)    WHAT YOU NEED TO KNOW:    What is gastroesophageal reflux disease (GERD)? GERD is reflux that occurs more than twice a week for a few weeks. Reflux means acid and food in the stomach back up into the esophagus. It usually causes heartburn and other symptoms. GERD can cause other health problems over time if it is not treated.    Digestive Tract         What causes GERD? GERD often occurs when the lower muscle (sphincter) of the esophagus does not close properly. The sphincter normally opens to let food into the stomach. It then closes to keep food and stomach acid in the stomach. If the sphincter does not close properly, stomach acid and food back up (reflux) into the esophagus. The following may increase your risk for GERD:  •Certain foods such as spicy foods, chocolate, foods that contain caffeine, peppermint, and fried foods      •Hiatal hernia      •Certain medicines such as calcium channel blockers (used to treat high blood pressure), allergy medicines, sedatives, or antidepressants      •Pregnancy or obesity      •Lying down after a meal      •Drinking alcohol or smoking      What are the signs and symptoms of GERD?   •Heartburn (burning pain in your chest)      •Pain after meals that spreads to your neck, jaw, or shoulder      •Pain that gets better when you change positions      •Bitter or acid taste in your mouth      •A dry cough      •Trouble swallowing or pain with swallowing      •Hoarseness or a sore throat      •Burping or hiccups      •Feeling full soon after you start eating      How is GERD diagnosed? Your healthcare provider will ask about your symptoms and when they started. Tell him or her about other medical conditions you have, your eating habits, and your activities. You may also need any of the following:   •The amount of acid in your esophagus and stomach may be checked. A small probe is used to check the amount.      •An endoscopy is a procedure used to look at the inside of your esophagus and stomach. An endoscope is a bendable tube with a light and camera on the end. Your healthcare provider may remove a small sample of tissue and send it to a lab for tests.      •X-ray pictures may be taken of your stomach and intestines (bowel). You may be given a chalky liquid to drink before the pictures are taken. This liquid helps your stomach and intestines show up better on the x-rays.      •Pressure and function tests of your esophagus can help find problems such as a hiatal hernia.      How is GERD treated?   •Medicines are used to decrease stomach acid. Medicine may also be used to help your lower esophageal sphincter and stomach contract (tighten) more.      •Surgery is done to wrap the upper part of the stomach around the esophageal sphincter. This will strengthen the sphincter and prevent reflux.      How can I manage GERD?     Prevent GERD     •Do not have foods or drinks that may increase heartburn. These include chocolate, peppermint, fried or fatty foods, drinks that contain caffeine, or carbonated drinks (soda). Other foods include spicy foods, onions, tomatoes, and tomato-based foods. Do not have foods or drinks that can irritate your esophagus, such as citrus fruits, juices, and alcohol.      •Do not eat large meals. When you eat a lot of food at one time, your stomach needs more acid to digest it. Eat 6 small meals each day instead of 3 large ones, and eat slowly. Do not eat meals 2 to 3 hours before bedtime.      •Elevate the head of your bed. Place 6-inch blocks under the head of your bed frame. You may also use more than one pillow under your head and shoulders while you sleep.      •Maintain a healthy weight. If you are overweight, weight loss may help relieve symptoms of GERD.      •Do not smoke. Smoking weakens the lower esophageal sphincter and increases the risk of GERD. Ask your healthcare provider for information if you currently smoke and need help to quit. E-cigarettes or smokeless tobacco still contain nicotine. Talk to your healthcare provider before you use these products.       •Do not wear clothing that is tight around your waist. Tight clothing can put pressure on your stomach and cause or worsen GERD symptoms.       Call your local emergency number (911 in the [place]) if:   •You have severe chest pain and sudden trouble breathing.          When should I seek immediate care?   •You have trouble breathing after you vomit.      •You have trouble swallowing, or pain with swallowing.      •Your bowel movements are black, bloody, or tarry-looking.      •Your vomit looks like coffee grounds or has blood in it.      When should I call my doctor?   •You feel full and cannot burp or vomit.      •You vomit large amounts, or you vomit often.      •You are losing weight without trying.      •Your symptoms get worse or do not improve with treatment.      •You have questions or concerns about your condition or care.      CARE AGREEMENT:    You have the right to help plan your care. Learn about your health condition and how it may be treated. Discuss treatment options with your healthcare providers to decide what care you want to receive. You always have the right to refuse treatment.      Chest Pain    WHAT YOU NEED TO KNOW:    What do I need to know about chest pain? Chest pain can be caused by a range of conditions, from not serious to life-threatening.    What may cause or increase my risk for chest pain?   •A digestion problem, such as acid reflux or a stomach ulcer      •An anxiety attack or a strong emotion, such as anger      •Infection, inflammation, or a fracture in the bones or cartilage in your chest      •Poor blood flow to your heart (angina)      •A life-threatening condition, such as a heart attack or blood clot in your lungs      What other symptoms might I have with chest pain?   •A burning feeling behind your breastbone      •A racing or slow heartbeat      •Fever or sweating      •Nausea or vomiting      •Shortness of breath      •Discomfort or pressure that spreads from your chest to your back, jaw, or arm      •Feeling weak, tired, or faint      How is the cause of chest pain diagnosed? Your healthcare provider will examine you. Describe your chest pain in as much detail as possible. Tell him or her where your pain is and when it began. Tell the provider if you notice anything that makes the pain worse or better. Tell him or her if it is constant or comes and goes. Your healthcare provider will ask about any medicines you use and medical conditions you have. He or she will also examine you. You may also need any of the following tests:  •An EKG is a test that records your heart's electrical activity.      •Blood tests check for heart damage and signs of a heart attack.      •An echocardiogram uses sound waves to see if blood is flowing normally through your heart.      •An ultrasound, x-ray, CT, or MRI scan may show the cause of your chest pain. You may be given contrast liquid to help your heart show up better in the pictures. Tell the healthcare provider if you have ever had an allergic reaction to contrast liquid. Do not enter the MRI room with anything metal. Metal can cause serious injury. Tell the healthcare provider if you have any metal in or on your body.      •An endoscopy may be done to check for ulcers or problem with your esophagus.  Upper Endoscopy           How is chest pain treated?   •Medicines may be given to treat the cause of your chest pain. Examples include pain medicine, anxiety medicine, or medicines to increase blood flow to your heart. Do not take certain medicines without asking your healthcare provider first. These include NSAIDs, herbal or vitamin supplements, or hormones (estrogen or progestin).      •A stent may be placed if your chest pain is caused by blockage in your heart. A stent is a wire mesh tube that helps hold your artery open. You may need more than 1 stent.      What are some healthy living tips? The following are general healthy guidelines. If the cause of your chest pain is known, your healthcare provider will give you specific guidelines to follow.  •Do not smoke. Nicotine and other chemicals in cigarettes and cigars can cause lung and heart damage. Ask your healthcare provider for information if you currently smoke and need help to quit. E-cigarettes or smokeless tobacco still contain nicotine. Talk to your healthcare provider before you use these products.      •Choose a variety of healthy foods as often as possible. Include fresh, frozen, or canned fruits and vegetables. Also include low-fat dairy products, fish, chicken (without skin), and lean meats. Your healthcare provider or a dietitian can help you create meal plans. You may need to avoid certain foods or drinks if your pain is caused by a digestion problem.  Healthy Foods           •Lower your sodium (salt) intake. Limit foods that are high in sodium, such as canned foods, salty snacks, and cold cuts. If you add salt when you cook food, do not add more at the table. Choose low-sodium canned foods as much as possible.             •Drink plenty of water every day. Water helps your body to control your temperature and blood pressure. Ask your healthcare provider how much water you should drink every day.      •Ask about activity. Your healthcare provider will tell you which activities to limit or avoid. Ask when you can drive, return to work, and have sex. Ask about the best exercise plan for you.      •Maintain a healthy weight. Ask your healthcare provider what a healthy weight is for you. Ask him or her to help you create a weight loss plan if you are overweight.      •Ask about vaccines you may need. Get the influenza (flu) vaccine every year as soon as recommended, usually in September or October. You may also need a pneumococcal vaccine to prevent pneumonia. The vaccine is usually given every 5 years, starting at age 65. Your healthcare provider can tell you if should get other vaccines, and when to get them.      Call your local emergency number (911 in the [place]) or have someone call if:   •You have any of the following signs of a heart attack: ?Squeezing, pressure, or pain in your chest      ?You may also have any of the following: ?Discomfort or pain in your back, neck, jaw, stomach, or arm      ?Shortness of breath      ?Nausea or vomiting      ?Lightheadedness or a sudden cold sweat            When should I seek immediate care?   •You have chest discomfort that gets worse, even with medicine.      •You cough or vomit blood.      •Your bowel movements are black or bloody.       •You cannot stop vomiting, or it hurts to swallow.      When should I call my doctor?   •You have questions or concerns about your condition or care.          CARE AGREEMENT:    You have the right to help plan your care. Learn about your health condition and how it may be treated. Discuss treatment options with your healthcare providers to decide what care you want to receive. You always have the right to refuse treatment.

## 2021-08-19 NOTE — ED ADULT TRIAGE NOTE - NS ED TRIAGE AVPU SCALE
74 Alert-The patient is alert, awake and responds to voice. The patient is oriented to time, place, and person. The triage nurse is able to obtain subjective information.

## 2021-08-19 NOTE — ED PROVIDER NOTE - CARE PROVIDER_API CALL
Tom Thibodeaux (MD)  Cardiovascular Disease; Internal Medicine; Interventional Cardiology  [address] [place] [address]  Phone: [phone]  Fax: [phone]  Follow Up Time:

## 2021-08-19 NOTE — ED ADULT TRIAGE NOTE - CHIEF COMPLAINT QUOTE
I had palpitations, I did go to the doctor today, I had burning in the chest. They set an endoscopy appointment. I was laying down having coffeee and a donut and I started feeling bad - patient

## 2021-08-19 NOTE — ED PROVIDER NOTE - NS ED ROS FT
Review of Systems    Constitutional: (-) fever  Cardiovascular: (-) chest pain, (-) syncope, (+) burning sensation   Respiratory: (-) cough, (-) shortness of breath  Gastrointestinal: (-) vomiting, (-) diarrhea, (-) abdominal pain  Musculoskeletal: (-) neck pain, (-) back pain, (-) joint pain  Integumentary: (-) rash, (-) edema  Neurological: (-) headache, (-) altered mental status    Except as documented in the HPI, all other systems are negative.

## 2021-08-19 NOTE — ED PROVIDER NOTE - PHYSICAL EXAMINATION
VS reviewed, stable.  Gen: interactive, well appearing, no acute distress  HEENT: NC/AT  Neck: FROM, supple, no cervical LAD  Chest: CTA b/l, no crackles/wheezes, good air entry, no tachypnea or retractions  CV: regular rate and rhythm, no murmurs   Abd: soft, nontender, nondistended, no HSM appreciated, +BS VS reviewed, stable.  Gen: interactive, well appearing, no acute distress  HEENT: NC/AT  Neck: FROM, supple, no cervical LAD  Chest: CTA b/l, no crackles/wheezes, good air entry, no tachypnea or retractions  CV: regular rate and rhythm, no murmurs   Abd: soft, nontender, nondistended, no HSM appreciated

## 2021-08-19 NOTE — ED PROVIDER NOTE - ATTENDING CONTRIBUTION TO CARE
I personally evaluated the patient. I reviewed the Resident’s or Physician Assistant’s note (as assigned above), and agree with the findings and plan except as documented in my note.    32 yo M no PMHx, smoker, c/o acute onset burning and epigastric burning 2 hours ago after eating a heavy meal and coffee. No CP. No SOB. No n/v.     CONSTITUTIONAL: Well-developed; well-nourished; in no acute distress. Sitting up and providing appropriate history and physical examination  SKIN: skin exam is warm and dry, no acute rash.  HEAD: Normocephalic; atraumatic.  EYES: PERRL, 3 mm bilateral, no nystagmus, EOM intact; conjunctiva and sclera clear.  ENT: No nasal discharge; airway clear.  NECK: Supple; non tender.+ full passive ROM in all directions. No JVD  CARD: S1, S2 normal; no murmurs, gallops, or rubs. Regular rate and rhythm. + Symmetric Strong Pulses  RESP: No wheezes, rales or rhonchi. Good air movement bilaterally  ABD: soft; non-distended; non-tender. No Rebound, No Gaurding, No signs of peritnitis, No CVA tenderness  EXT: Normal ROM. No clubbing, cyanosis or edema. Dp and Pt Pulses intact. Cap refill less than 3 seconds  NEURO: CN 2-12 intact, normal finger to nose, normal romberg, stable gait, no sensory or motor deficits, Alert, oriented, grossly unremarkable. No Focal deficits. GCS 15. NIH 0  PSYCH: Cooperative, appropriate.    Plan- ECG, labs, CXR, reassess

## 2021-08-19 NOTE — ED PROVIDER NOTE - PATIENT PORTAL LINK FT
You can access the FollowMyHealth Patient Portal offered by [facility] by registering at the following website: [email or web address]. By joining [employer]’s FollowMyHealth portal, you will also be able to view your health information using other applications (apps) compatible with our system.

## 2021-08-19 NOTE — ED ADULT NURSE NOTE - OBJECTIVE STATEMENT
I had palpitations, I did go to the doctor today, I had burning in the chest. They set an endoscopy appointment. I was laying down having coffee and a donut and I started feeling bad - patient

## 2021-08-19 NOTE — ED PROVIDER NOTE - PROGRESS NOTE DETAILS
TN - pt stable, resolved burning sensation. pt aware of results and plan to d/c home w/ f/u to GI HEART score- low risk. Pt denies any CP. His ECG and trop are normal. Will DC w cardiology and GI evaluation. Full DC instructions discussed and patient knows when to seek immediate medical attention. Patient has proper follow-up. All results discussed with the patient they may require further work-up. Limitations of ED work-up discussed. All  questions and concerns from patient or family addressed. Understanding of insturctions verbalized

## 2022-05-15 ENCOUNTER — EMERGENCY (EMERGENCY)
Facility: HOSPITAL | Age: 32
LOS: 0 days | Discharge: HOME | End: 2022-05-15
Attending: EMERGENCY MEDICINE | Admitting: EMERGENCY MEDICINE
Payer: COMMERCIAL

## 2022-05-15 VITALS
HEIGHT: 70 IN | SYSTOLIC BLOOD PRESSURE: 129 MMHG | TEMPERATURE: 99 F | WEIGHT: 214.95 LBS | DIASTOLIC BLOOD PRESSURE: 80 MMHG | RESPIRATION RATE: 18 BRPM | HEART RATE: 84 BPM

## 2022-05-15 DIAGNOSIS — R00.2 PALPITATIONS: ICD-10-CM

## 2022-05-15 DIAGNOSIS — K21.9 GASTRO-ESOPHAGEAL REFLUX DISEASE WITHOUT ESOPHAGITIS: ICD-10-CM

## 2022-05-15 PROCEDURE — 93010 ELECTROCARDIOGRAM REPORT: CPT

## 2022-05-15 PROCEDURE — 99284 EMERGENCY DEPT VISIT MOD MDM: CPT

## 2022-05-15 NOTE — ED PROVIDER NOTE - PHYSICAL EXAMINATION
CONSTITUTIONAL: Well-appearing; well-nourished; in no apparent distress.   EYES: PERRL; EOM intact.   CARDIOVASCULAR: Normal S1, S2; no murmurs, rubs, or gallops.   RESPIRATORY: Normal chest excursion with respiration; breath sounds clear and equal bilaterally; no wheezes, rhonchi, or rales.  GI/: Normal bowel sounds; non-distended; non-tender; no palpable organomegaly.   MS: No calf swelling and tenderness.  SKIN: Normal for age and race; warm; dry; good turgor; no apparent lesions or exudate.   NEURO/PSYCH: A & O x 4; grossly unremarkable. Anxious mood

## 2022-05-15 NOTE — ED PROVIDER NOTE - PATIENT PORTAL LINK FT
You can access the FollowMyHealth Patient Portal offered by [facility] by registering at the following website: [email or web address]. By joining [employer]’s FollowMyHealth portal, you will also be able to view your health information using other applications (apps) compatible with our system.

## 2022-05-15 NOTE — ED PROVIDER NOTE - CARE PROVIDERS DIRECT ADDRESSES
[email or web address] Hydroxychloroquine Pregnancy And Lactation Text: This medication has been shown to cause fetal harm but it isn't assigned a Pregnancy Risk Category. There are small amounts excreted in breast milk.

## 2022-05-15 NOTE — ED PROVIDER NOTE - CARE PROVIDER_API CALL
Sebastian Serrano)  Cardiovascular Disease; Internal Medicine  [address] [place] [address]  Phone: [phone]  Fax: [phone]  Follow Up Time: 1-3 Days

## 2022-05-15 NOTE — ED PROVIDER NOTE - ATTENDING APP SHARED VISIT CONTRIBUTION OF CARE
I personally evaluated the patient. I reviewed the Resident´s or Physician Assistant´s note (as assigned above), and agree with the findings and plan except as documented in my note.  32-year-old male no past medical history, presents with palpitations, sweating and numbness to the hands tonight.  Denies cocaine or drug use.  Felt very anxious but feels better now.  Exam shows alert patient in no distress, HEENT NCAT PERRL, neck supple, lungs clear, RR S1S2, abdomen soft NT +BS, no CCE, neuro A&OX3 GCS 15 no deficits.

## 2022-05-15 NOTE — ED PROVIDER NOTE - OBJECTIVE STATEMENT
32 years old male history of GERD presents complaint of palpitations while he was watching movie earlier today.  Patient reports he was found his heart rate slowing down while he was watching movie.  He got nervous and started having racing heart rate so he came to ED for evaluations.  Reports he is feeling better upon ED arrival. Patient otherwise denies chest pain, shortness of breath, diaphoresis and weakness associated palpitations.  Further denies fever, chills, recent illness, abdominal pain, vomiting, and diarrhea.

## 2022-05-15 NOTE — ED PROVIDER NOTE - CLINICAL SUMMARY MEDICAL DECISION MAKING FREE TEXT BOX
Patient asymptomatic in ED.  EKG normal.  Will DC and refer to cardiology.  Instructed to return if symptoms recur.

## 2022-05-15 NOTE — ED PROVIDER NOTE - NS ED ATTENDING STATEMENT MOD
This was a shared visit with the TEAGAN. I reviewed and verified the documentation and independently performed the documented:

## 2022-05-15 NOTE — ED PROVIDER NOTE - NSFOLLOWUPINSTRUCTIONS_ED_ALL_ED_FT
.Heart Palpitations    WHAT YOU NEED TO KNOW:    Heart palpitations are feelings that your heart races, jumps, throbs, or flutters. You may feel extra beats, no beats for a short time, or skipped beats. You may have these feelings in your chest, throat, or neck. They may happen when you are sitting, standing, or lying. Heart palpitations may be frightening, but are usually not caused by a serious problem.     DISCHARGE INSTRUCTIONS:    Call 911 or have someone else call for any of the following:     You have any of the following signs of a heart attack:   Squeezing, pressure, or pain in your chest      You may also have any of the following:   Discomfort or pain in your back, neck, jaw, stomach, or arm      Shortness of breath      Nausea or vomiting      Lightheadedness or a sudden cold sweat      You have any of the following signs of a stroke:   Numbness or drooping on one side of your face       Weakness in an arm or leg      Confusion or difficulty speaking      Dizziness, a severe headache, or vision loss      You faint or lose consciousness.     Return to the emergency department if:     Your palpitations happen more often or get more intense.         Contact your healthcare provider if:     You have new or worsening swelling in your feet or ankles.      You have questions or concerns about your condition or care.    Follow up with your healthcare provider as directed: You may need to follow up with a cardiologist. You may need tests to check for heart problems that cause palpitations. Write down your questions so you remember to ask them during your visits.     Keep a record: Write down when your palpitations start and stop, what you were doing when they started, and your symptoms. Keep track of what you ate or drank within a few hours of your palpitations. Include anything that seemed to help your symptoms, such as lying down or holding your breath. This record will help you and your healthcare provider learn what triggers your palpitations. Bring this record with you to your follow up visits.    Help prevent heart palpitations:     Manage stress and anxiety. Find ways to relax such as listening to music, meditating, or doing yoga. Exercise can also help decrease stress and anxiety. Talk to someone you trust about your stress or anxiety. You can also talk to a therapist.       Get plenty of sleep every night. Ask your healthcare provider how much sleep you need each night.       Do not drink caffeine or alcohol. Caffeine and alcohol can make your palpitations worse. Caffeine is found in soda, coffee, tea, chocolate, and drinks that increase your energy.       Do not smoke. Nicotine and other chemicals in cigarettes and cigars may damage your heart and blood vessels. Ask your healthcare provider for information if you currently smoke and need help to quit. E-cigarettes or smokeless tobacco still contain nicotine. Talk to your healthcare provider before you use these products.       Do not use illegal drugs. Talk to your healthcare provider if you use illegal drugs and want help to quit.          © Copyright [employer] 2019 All illustrations and images included in [employer]Notes are the copyrighted property of [employer] or [employer].

## 2022-11-21 NOTE — ED PROVIDER NOTE - ATTENDING CONTRIBUTION TO CARE
Patient : Priscilla Hernández Age: 17 year old Sex: male   MRN: [id number] Encounter Date: 11/21/2022 E14/14    History     Chief Complaint   Patient presents with   • Wrist Pain     right       HPI    Priscilla Hernández is a 17 year old presenting to the emergency department with right wrist pain that began after the pt slipped and fell on ice about 30 minutes ago. The pt states that he was walking down a steep hill and he slipped. He reached out to catch himself and states that he felt his wrist \"snap\". He denies hitting his head or other pain and injuries. The pt is right hand dominant. He reports no known drug allergies. There are no further complaints or modifying factors at this time.        No Known Allergies    Current Facility-Administered Medications   Medication   • HYDROmorphone (DILAUDID) injection 0.5 mg     Current Outpatient Medications   Medication Sig   • HYDROcodone-acetaminophen (NORCO) 5-325 MG per tablet Take 1 tablet by mouth every 4 hours as needed for Pain.   • ibuprofen (MOTRIN) 600 MG tablet Take 1 tablet by mouth every 8 hours as needed for Pain.     History reviewed. No pertinent past medical history.    History reviewed. No pertinent past surgical history.    History reviewed. No pertinent family history.      Social History     Tobacco Use   • Smoking status: Never Smoker   • Smokeless tobacco: Never Used   Substance Use Topics   • Alcohol use: Never   • Drug use: Never       Review of Systems     Review of Systems   Constitutional: Negative for fatigue.   HENT: Negative for congestion.    Eyes: Negative for itching.   Respiratory: Negative for shortness of breath.    Cardiovascular: Negative for chest pain.   Gastrointestinal: Negative for nausea.   Genitourinary: Negative for difficulty urinating.   Musculoskeletal: Negative for myalgias.        Positive for right wrist pain   Skin: Negative for rash.   Allergic/Immunologic: Negative for immunocompromised state.   Neurological:  Negative for weakness.   Hematological: Does not bruise/bleed easily.   Psychiatric/Behavioral: The patient is not nervous/anxious.        Physical Exam     ED Triage Vitals   ED Triage Vitals Group      Temp 11/21/22 1527 98.4 °F (36.9 °C)      Heart Rate 11/21/22 1506 92      Resp 11/21/22 1506 18      BP 11/21/22 1506 130/61      SpO2 11/21/22 1506 97 %      EtCO2 mmHg --       Height --       Weight 11/21/22 1518 153 lb 14.1 oz (69.8 kg)      Weight Scale Used --       BMI (Calculated) --       IBW/kg (Calculated) --        Physical Exam  Vitals and nursing note reviewed.   Constitutional:       Appearance: He is well-developed.   HENT:      Head: Normocephalic and atraumatic.   Eyes:      Conjunctiva/sclera: Conjunctivae normal.      Pupils: Pupils are equal, round, and reactive to light.   Neck:      Vascular: No JVD.   Cardiovascular:      Rate and Rhythm: Normal rate and regular rhythm.      Heart sounds: Normal heart sounds. No murmur heard.    No friction rub. No gallop.   Pulmonary:      Effort: Pulmonary effort is normal. No respiratory distress.      Breath sounds: Normal breath sounds. No stridor. No wheezing.   Chest:      Chest wall: No tenderness.   Abdominal:      General: Bowel sounds are normal. There is no distension.      Palpations: Abdomen is soft. There is no mass.      Tenderness: There is no abdominal tenderness. There is no guarding or rebound.   Musculoskeletal:         General: No tenderness. Normal range of motion.      Right shoulder: No tenderness.      Right elbow: No tenderness.      Right wrist: Deformity (dinner fork) present.      Cervical back: Normal range of motion. No tenderness.      Comments: There are no other areas of deformity   Skin:     General: Skin is warm and dry.      Coloration: Skin is not pale.      Findings: No erythema or rash.   Neurological:      Mental Status: He is alert and oriented to person, place, and time.      GCS: GCS eye subscore is 4. GCS verbal  subscore is 5. GCS motor subscore is 6.      Cranial Nerves: No cranial nerve deficit.      Sensory: Sensation is intact. No sensory deficit.      Motor: Motor function is intact.   Psychiatric:         Behavior: Behavior normal.           Procedures     Orthopedic Injury Treatment - Upper Extremity    Date/Time: 11/21/2022 4:39 PM  Performed by: Cb Gutiérrez MD  Authorized by: Cb Gutiérrez MD     Consent:     Consent obtained:  Verbal    Consent given by:  Parent and patient    Risks, benefits, and alternatives were discussed: yes      Risks discussed:  Nerve damage, irreducible dislocation, vascular damage and stiffness    Alternatives discussed:  No treatment and delayed treatment  Universal protocol:     Procedure explained and questions answered to patient or proxy's satisfaction: yes      Relevant documents present and verified: yes      Immediately prior to procedure, a time out was called: yes      Patient identity confirmed:  Verbally with patient and arm band  Location:     Location:  Wrist    Wrist location:  R wrist  Pre-procedure details:     Pre-procedure imaging:  X-ray    Imaging findings: fracture present      Distal perfusion: normal    Sedation:     Sedation type:  None  Anesthesia:     Anesthesia method: Hematoma block 10 cc of 1% lidocaine.  Procedure details:     Manipulation performed: yes      Wrist reduction method:  Direct traction and traction and counter traction    Reduction successful: yes      Reduction confirmed with imaging: yes      Immobilization:  Splint    Splint type:  Sugar tong  Post-procedure details:     Neurological function: normal      Distal perfusion: normal      Range of motion: improved      Procedure completion:  Tolerated  Comments:      Hematoma block without epi, 10 cc 1% lidocaine        Lab Results     No results found for this visit on 11/21/22.    EKG     No EKG performed    Radiology Results     Imaging Results          XR Wrist 3+ View Right (In process)                 XR Forearm 2 View Right (Final result)  Result time 11/21/22 16:42:53    Final result                 Impression:    IMPRESSION:    1. Distal radial fracture as discussed above.      4 VIEWS OF THE RIGHT WRIST     INDICATION: As above.    COMPARISON: None.    FINDINGS:    Demonstration of a Salter-Alves type II injury with significant  displacement of the physis dorsally. The physis is rotated and a small  fracture fragment is observed proximally. Alignment is maintained  No osseous masses.  Soft tissue swelling and deformity.      IMPRESSION:  1. Distal radial fracture as discussed above.               Narrative:    TWO VIEWS OF THE RIGHT FOREARM     INDICATION: Injury.    COMPARISON: None.    FINDINGS:    Demonstration of a Salter type II injury of the distal radius. Epiphysis is  displaced dorsally and rotated. Small fracture fragment is observed  adjacently. Alignment is maintained.  No osseous masses.  Soft tissue swelling and deformity.                                 XR Wrist 3+ View Right (Final result)  Result time 11/21/22 16:42:53    Final result                 Impression:    IMPRESSION:    1. Distal radial fracture as discussed above.      4 VIEWS OF THE RIGHT WRIST     INDICATION: As above.    COMPARISON: None.    FINDINGS:    Demonstration of a Salter-Alves type II injury with significant  displacement of the physis dorsally. The physis is rotated and a small  fracture fragment is observed proximally. Alignment is maintained  No osseous masses.  Soft tissue swelling and deformity.      IMPRESSION:  1. Distal radial fracture as discussed above.               Narrative:    TWO VIEWS OF THE RIGHT FOREARM     INDICATION: Injury.    COMPARISON: None.    FINDINGS:    Demonstration of a Salter type II injury of the distal radius. Epiphysis is  displaced dorsally and rotated. Small fracture fragment is observed  adjacently. Alignment is maintained.  No osseous masses.  Soft tissue swelling and  deformity.                                  ED Medications     ED Medication Orders (From admission, onward)    Ordered Start     Status Ordering Provider    11/21/22 1626 11/21/22 1627  ketorolac (TORADOL) injection 15 mg  ONCE         Last MAR action: Given PERI TIJERINA    11/21/22 1541 11/21/22 1542  lidocaine 1 % injection 100 mg  ONCE         Last MAR action: Given PERI TIJERINA    11/21/22 1520 11/21/22 1521  ondansetron (ZOFRAN) injection 4 mg  ONCE         Last MAR action: Given PERI TIJERINA    11/21/22 1520 11/21/22 1519  HYDROmorphone (DILAUDID) injection 0.5 mg  EVERY 15 MIN PRN         Last MAR action: Given PERI TIJERINA          ED Course     Vitals:    11/21/22 1831 11/21/22 1844 11/21/22 1845 11/21/22 1900   BP: (!) 152/57 (!) 147/77  125/64   BP Location:  LUE - Left upper extremity     Patient Position:  Sitting/High-Gallardo's     Pulse: 96 94  94   Resp:  20 20    Temp:       TempSrc:       SpO2: 96% 95% 95% 96%   Weight:            4:12 PM I performed a hematoma block with 10 cc 1% lidocaine.    4:30 PM patient placed in finger traps.    5:11 PM I rechecked the patient who is laying in bed and not in any significant discomfort. His arm is in the finger trap without difficulty. I placed another weight and will evaluate the progress in abut 20 minutes. I informed them of the plan to follow up with orthopedics at [facility]. Pt and his grandfather understand and agree with the plan. All questions addressed.     5:47 PM I rechecked the patient whose deformity appears markedly improved. I informed them of the plan to place a splint. Pt and his grandfather understand and agree with the plan. All questions addressed.     7:31 PM I rechecked the patient who is sitting comfortably in bed. We discussed the results of his XRs post splint placement which indicated a successful reduction.  Status post splint patient's, hand is well perfused; he is able to move his fingers without difficulty.  He also  states that his cast is not too hot or tight.  I informed them of the plan to follow up at [facility] and treat the pain with hydrocodone and ibuprofen. Pt understands and agrees with the plan. All questions addressed.  Discussed with grandfather who will have patient follow-up with [facility].  I provided phone number for [facility].      PDMP reviewed: no aberrant behavior identified, prescription authorized.      Radiology Review: I have independently interpreted the Xray of the Right Wrist and have found Fracture of Distal radius.  I am awaiting on the final radiology read.            Consults                    MDM                         Patient with distal radius fracture.  It was reduced.  He will follow up with [facility].        Does the Patient have sepsis: NO     Critical Care       No Critical Care        Disposition       Clinical Impression and Diagnosis  7:31 PM       ED Diagnosis     Diagnosis Comment Associated Orders       Final diagnosis    Closed Colles' fracture of right radius, initial encounter --  HYDROCODONE-ACETAMINOPHEN 5-325 MG PO TABS            The patient was provided with a recommendation to follow up with a primary care provider and obtain reassessment of his/her blood pressure within three months.    Follow Up:  Follow-up with [facility]  [phone]    Call in a.m. for appointment within the next 3-4 days          Summary of your Discharge Medications      Take these Medications      Details   HYDROcodone-acetaminophen 5-325 MG per tablet  Commonly known as: NORCO   Take 1 tablet by mouth every 4 hours as needed for Pain.     ibuprofen 600 MG tablet  Commonly known as: MOTRIN   Take 1 tablet by mouth every 8 hours as needed for Pain.            Pt is discharged to home/self care in stable condition.        Discharge 11/21/2022  7:30 PM  Priscilla Hernández discharge to home/self care.                I have  reviewed the information recorded by the scribe for accuracy and agree with its contents.    ____________________________________________________________________  Hannah Josue acting as a scribe for Dr. Cb Gutiérrez  Dictation # [id number]  Scribe: Hannah Gutiérrez MD  11/21/22 1951     30yM otherwise healthy p/w RLE pain and swelling - pt injured his RLE "a few days ago", including sig abrasions to anterior shin and pain to calf.  Still able to ambulate.  Noticed persistent if mildly worsening swelling of his RLE and has been elevating it at home when sitting.  No fevers, CP, SOB.  No recent surgery or immobilization.  Pt reports prior (healed) injury to RLE and wonders whether it is more likely to swell in response.    intact distal perfusion, sensation and strength  well healing abrasions to anterior shins w/o surrounding cellulitis or drainage  mild calf fullness/swelling compared to LLE
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
## 2023-06-28 NOTE — ED PROVIDER NOTE - NSICDXPASTMEDICALHX_GEN_ALL_CORE_FT
Detail Level: Detailed
Hide Include Location In Plan Question?: No
PAST MEDICAL HISTORY:  No pertinent past medical history

## 2023-11-29 NOTE — ED PROVIDER NOTE - PHYSICAL EXAMINATION
VITAL SIGNS: I have reviewed nursing notes and confirm.  CONSTITUTIONAL: Well-developed; well-nourished; in no acute distress.  SKIN: Skin exam is warm and dry, no acute rash.  HEAD: Normocephalic; atraumatic.  EYES: Conjunctiva and sclera clear.  EXT: Normal ROM. (+) well healing abrasions to right anterior shin without surrounding erythema/warmth. (+) mild right lateral calf swelling compared with left, underlying ecchymosis; no calf TTP. DP 2+ B/L and equal.   NEURO: Alert, oriented. Grossly unremarkable. No focal deficits.
1949

## 2024-06-27 NOTE — ED ADULT NURSE NOTE - CHIEF COMPLAINT
----- Message from Francisca Sprague sent at 6/27/2024  8:38 AM CDT -----  Pt daughter Eda called to get Rx dosage increase and refilled can be reached at [phone]  
The patient is a 30y Male complaining of lower leg pain/injury.

## 2025-03-28 ENCOUNTER — EMERGENCY (EMERGENCY)
Facility: HOSPITAL | Age: 35
LOS: 1 days | Discharge: HOME | End: 2025-03-28
Attending: EMERGENCY MEDICINE
Payer: COMMERCIAL

## 2025-03-28 VITALS
OXYGEN SATURATION: 98 % | HEIGHT: 69 IN | RESPIRATION RATE: 18 BRPM | DIASTOLIC BLOOD PRESSURE: 86 MMHG | TEMPERATURE: 98 F | WEIGHT: 206.13 LBS | SYSTOLIC BLOOD PRESSURE: 136 MMHG | HEART RATE: 90 BPM

## 2025-03-28 PROCEDURE — 93005 ELECTROCARDIOGRAM TRACING: CPT

## 2025-03-28 PROCEDURE — 99283 EMERGENCY DEPT VISIT LOW MDM: CPT

## 2025-03-28 PROCEDURE — 99285 EMERGENCY DEPT VISIT HI MDM: CPT

## 2025-03-28 PROCEDURE — 93010 ELECTROCARDIOGRAM REPORT: CPT

## 2025-03-28 NOTE — ED PROVIDER NOTE - PHYSICAL EXAMINATION
General: Patient well appearing, vital signs within normal limits  HEENT: MMM, trachea midline  Respiratory: No respiratory distress  GI: Soft, nontender  MSK: No lower extremity edema  Neuro: Moves all extremities  Skin: No rashes or lesions

## 2025-03-28 NOTE — ED PROVIDER NOTE - CLINICAL SUMMARY MEDICAL DECISION MAKING FREE TEXT BOX
35-year-old male with no significant past medical history presents for evaluation of chest discomfort, palpitations.  EKG shows no ischemic changes.  Patient is completely symptom-free on my physical exam and history taking.  He refuses labs and x-ray and further evaluation.

## 2025-03-28 NOTE — ED PROVIDER NOTE - NSFOLLOWUPINSTRUCTIONS_ED_ALL_ED_FT
You were evaluated for chest discomfort and palpitations.  Your EKG shows no evidence of heart attack or arrhythmia.  Please follow-up with your doctor.
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
## 2025-03-28 NOTE — ED PROVIDER NOTE - PATIENT PORTAL LINK FT
You can access the FollowMyHealth Patient Portal offered by [facility] by registering at the following website: [email or web address]. By joining [email or web address]’s FollowMyHealth portal, you will also be able to view your health information using other applications (apps) compatible with our system.